# Patient Record
Sex: FEMALE | Race: WHITE | NOT HISPANIC OR LATINO | Employment: FULL TIME | URBAN - METROPOLITAN AREA
[De-identification: names, ages, dates, MRNs, and addresses within clinical notes are randomized per-mention and may not be internally consistent; named-entity substitution may affect disease eponyms.]

---

## 2017-03-21 ENCOUNTER — ALLSCRIPTS OFFICE VISIT (OUTPATIENT)
Dept: OTHER | Facility: OTHER | Age: 37
End: 2017-03-21

## 2017-03-21 DIAGNOSIS — F32.9 MAJOR DEPRESSIVE DISORDER, SINGLE EPISODE: ICD-10-CM

## 2017-03-21 DIAGNOSIS — F41.9 ANXIETY DISORDER: ICD-10-CM

## 2017-04-26 ENCOUNTER — APPOINTMENT (OUTPATIENT)
Dept: LAB | Facility: HOSPITAL | Age: 37
End: 2017-04-26
Payer: COMMERCIAL

## 2017-04-26 DIAGNOSIS — F32.9 MAJOR DEPRESSIVE DISORDER, SINGLE EPISODE: ICD-10-CM

## 2017-04-26 DIAGNOSIS — F41.9 ANXIETY DISORDER: ICD-10-CM

## 2017-04-26 LAB — TSH SERPL DL<=0.05 MIU/L-ACNC: 1.82 UIU/ML (ref 0.36–3.74)

## 2017-04-26 PROCEDURE — 36415 COLL VENOUS BLD VENIPUNCTURE: CPT

## 2017-04-26 PROCEDURE — 84443 ASSAY THYROID STIM HORMONE: CPT

## 2017-05-05 ENCOUNTER — ALLSCRIPTS OFFICE VISIT (OUTPATIENT)
Dept: OTHER | Facility: OTHER | Age: 37
End: 2017-05-05

## 2017-08-09 ENCOUNTER — APPOINTMENT (OUTPATIENT)
Dept: LAB | Facility: HOSPITAL | Age: 37
End: 2017-08-09
Payer: COMMERCIAL

## 2017-08-09 ENCOUNTER — TRANSCRIBE ORDERS (OUTPATIENT)
Dept: ADMINISTRATIVE | Facility: HOSPITAL | Age: 37
End: 2017-08-09

## 2017-08-09 DIAGNOSIS — Z00.8 HEALTH EXAMINATION IN POPULATION SURVEY: Primary | ICD-10-CM

## 2017-08-09 DIAGNOSIS — Z00.8 HEALTH EXAMINATION IN POPULATION SURVEY: ICD-10-CM

## 2017-08-09 LAB
CHOLEST SERPL-MCNC: 211 MG/DL (ref 50–200)
EST. AVERAGE GLUCOSE BLD GHB EST-MCNC: 126 MG/DL
HBA1C MFR BLD: 6 % (ref 4.2–6.3)
HDLC SERPL-MCNC: 39 MG/DL (ref 40–60)
LDLC SERPL CALC-MCNC: 146 MG/DL (ref 0–100)
TRIGL SERPL-MCNC: 132 MG/DL

## 2017-08-09 PROCEDURE — 80061 LIPID PANEL: CPT

## 2017-08-09 PROCEDURE — 83036 HEMOGLOBIN GLYCOSYLATED A1C: CPT

## 2017-08-09 PROCEDURE — 36415 COLL VENOUS BLD VENIPUNCTURE: CPT

## 2017-10-25 ENCOUNTER — ALLSCRIPTS OFFICE VISIT (OUTPATIENT)
Dept: OTHER | Facility: OTHER | Age: 37
End: 2017-10-25

## 2017-10-26 NOTE — PROGRESS NOTES
Assessment  1  Never a smoker   2  Viral illness (079 99) (B34 9)    Plan  Viral illness    · Ondansetron 4 MG Oral Tablet Disintegrating; 1 tab Q 6-8 hrs PRN for nauseou and  vomitting   · Oseltamivir Phosphate 75 MG Oral Capsule (Tamiflu); TAKE 1 CAPSULE TWICE  DAILY WITH MEALS    Discussion/Summary    Viral illness - most likely flu due to exposure, will start Tamiflu 75 mg 1 tab twice daily for 5 days, Zofran as needed every 6 hours for nausea, fluids, rest  Can return to work when fever free for 24 hours  - over due please schedule  as needed  Possible side effects of new medications were reviewed with the patient/guardian today  The treatment plan was reviewed with the patient/guardian  The patient/guardian understands and agrees with the treatment plan      Chief Complaint  Pt presents to the office with c/o flu like sx; pt took 3 motrin at 10 amdue01/06/16      History of Present Illness  HPI: Patient here c/o last night fever, body aches, vomited last night, short of breath, temp 102 F  Take 3 Motrin with temp 99 F  Works at [employer] and has had multiple cases of flu and mutiple exposures, has not had flu shot yet  Active Problems  1  Anxiety (300 00) (F41 9)   2  Depression (311) (F32 9)   3  Encounter for routine gynecological examination (V72 31) (Z01 419)   4  Insomnia (780 52) (G47 00)    Past Medical History  1  History of nausea and vomiting (V12 79) (Z87 898)   2  History of tonsillitis (V12 69) (Z87 09)   3  History of Sore throat (462) (J02 9)   4  Urinary tract infection (599 0) (N39 0)  Active Problems And Past Medical History Reviewed: The active problems and past medical history were reviewed and updated today  Family History  Mother    1  Family history of Family Health Status Of Mother - Good  Father    2  Family history of Family Health Status Of Father - Good  Maternal Grandmother    3  Family history of Breast Cancer (V16 3)   4   Family history of Ovarian Cancer (V16 41)  Paternal Grandmother    5  Family history of Diabetes Mellitus (V18 0)  Paternal Grandfather    10  Family history of Diabetes Mellitus (V18 0)  Family History Reviewed: The family history was reviewed and updated today  Social History   · Alcohol Use (History)   · Caffeine Use   · Denied: History of Drug Use   · Never a smoker   · Non drinker / no alcohol use   · Denied: History of Travel   · Uses Safety Equipment   · Uses Safety Equipment - Seatbelts  The social history was reviewed and updated today  The social history was reviewed and is unchanged  Surgical History  1  History of Biopsy Of Liver   2  History of Breast Surgery Reduction Procedure  Surgical History Reviewed: The surgical history was reviewed and updated today  Current Meds   1  ALPRAZolam 0 25 MG Oral Tablet; TAKE 1/2 TO 1 TABLET EVERY 6 TO 8 HOURS AS   NEEDED FOR ANXIETY; Therapy: [id number] to (Last Rx:21Mar2017) Ordered    The medication list was reviewed and updated today  Allergies  1  Misc  Sulfa Containing Compounds   2  Sulfones   3  Sulfonylureas   4  Clindamycin HCl CAPS   5  Penicillins  Denied    6  Thiazide-Type Diuretics    Vitals   Recorded: [id number] 12:39PM   Temperature 98 9 F, Oral   Heart Rate 72, R Radial   Pulse Quality Normal, R Radial   Respiration Quality Normal   Respiration 20   Systolic 545, RUE, Sitting   Diastolic 68, RUE, Sitting   Height 5 ft 3 5 in   Weight 259 lb 12 8 oz   BMI Calculated 45 3   BSA Calculated 2 17     Physical Exam    Constitutional   General appearance: Abnormal  -- fatigue nontoxic  Eyes   Conjunctiva and lids: No swelling, erythema or discharge  Pupils and irises: Equal, round and reactive to light  Ears, Nose, Mouth, and Throat   External inspection of ears and nose: Normal     Otoscopic examination: Tympanic membranes translucent with normal light reflex  Canals patent without erythema      Nasal mucosa, septum, and turbinates: Normal without edema or erythema  Oropharynx: Normal with no erythema, edema, exudate or lesions  Pulmonary   Respiratory effort: No increased work of breathing or signs of respiratory distress  Auscultation of lungs: Clear to auscultation  Cardiovascular   Palpation of heart: Normal PMI, no thrills  Auscultation of heart: Normal rate and rhythm, normal S1 and S2, without murmurs  Lymphatic   Palpation of lymph nodes in neck: No lymphadenopathy      Psychiatric   Orientation to person, place, and time: Normal     Mood and affect: Normal          Signatures   Electronically signed by : Lubna Sharma, [facility]; Oct 25 2017  1:24PM EST                       (Author)    Electronically signed by : FIORELLA Kirby ; Oct 25 2017  1:42PM EST                       (Author)

## 2017-12-21 ENCOUNTER — ALLSCRIPTS OFFICE VISIT (OUTPATIENT)
Dept: OTHER | Facility: OTHER | Age: 37
End: 2017-12-21

## 2018-01-13 VITALS
RESPIRATION RATE: 16 BRPM | HEIGHT: 63 IN | HEART RATE: 84 BPM | WEIGHT: 258.2 LBS | BODY MASS INDEX: 45.75 KG/M2 | DIASTOLIC BLOOD PRESSURE: 84 MMHG | SYSTOLIC BLOOD PRESSURE: 120 MMHG

## 2018-01-13 VITALS
WEIGHT: 250.5 LBS | BODY MASS INDEX: 44.38 KG/M2 | SYSTOLIC BLOOD PRESSURE: 122 MMHG | HEART RATE: 64 BPM | RESPIRATION RATE: 16 BRPM | DIASTOLIC BLOOD PRESSURE: 84 MMHG | HEIGHT: 63 IN

## 2018-01-15 VITALS
BODY MASS INDEX: 44.35 KG/M2 | RESPIRATION RATE: 20 BRPM | HEART RATE: 72 BPM | DIASTOLIC BLOOD PRESSURE: 68 MMHG | HEIGHT: 64 IN | WEIGHT: 259.8 LBS | TEMPERATURE: 98.9 F | SYSTOLIC BLOOD PRESSURE: 112 MMHG

## 2018-01-16 NOTE — MISCELLANEOUS
Message  Return to work or school:   Andrea Marx is under my professional care  She was seen in my office on 10/25/2017   She is able to return to work on  10/30/2017       [facility]        Signatures   Electronically signed by : Tim Newell, ; Oct 25 2017 12:54PM EST                       (Author)

## 2018-01-22 VITALS
RESPIRATION RATE: 16 BRPM | DIASTOLIC BLOOD PRESSURE: 88 MMHG | TEMPERATURE: 98.6 F | HEART RATE: 76 BPM | HEIGHT: 63 IN | WEIGHT: 268.7 LBS | BODY MASS INDEX: 47.61 KG/M2 | SYSTOLIC BLOOD PRESSURE: 116 MMHG

## 2018-01-23 NOTE — CONSULTS
Chief Complaint  Chief Complaint Free Text Note Form: New patient here for MWM consult; Stop Bang 2/8  History of Present Illness  Free Text HPI: Obesity-  Severity: severe  Onset: since HS  Modifiers: worse after pregnancies, bad eating habits, poor food prepper, stress causes poor eating pattern,; WW-lost 60lbs-8 years ago, isagenix-short term-30lbs, hCG drops 25-30lbs,  Associated Symptoms: stress incontinence, increased joint pain    Goals: 60-70lbs     Past Medical History    1  History of nausea and vomiting (V12 79) (Z87 898)   2  History of tonsillitis (V12 69) (Z87 09)   3  History of Sore throat (462) (J02 9)   4  Urinary tract infection (599 0) (N39 0)  Active Problems And Past Medical History Reviewed: The active problems and past medical history were reviewed and updated today  Assessment    1  Morbid obesity with BMI of 45 0-49 9, adult (278 01,V85 42) (I05 95,H00 20)   2  Hyperlipidemia (272 4) (E78 5)   3  Low HDL (under 40) (272 5) (E78 6)   4  Impaired fasting glucose (790 21) (R73 01)   5  Metabolic syndrome (019 7) (E88 81)    Discussion/Summary  Discussion Summary:   73-year-old female with dyslipidemia, metabolic syndrome and morbid obesity here to pursue medical weight management to improve weight and health  Obesity class 3:  -discussed options of conservative vs RAVINDER program +/- meal replacement vs VLCD and bariatric surgery  -initial focus of 5-10% weight loss over 3-6 mos for improved health  -screening labs-TSH A1c lipids within acceptable limits, check CMP  -discussed the role of weight loss medications    Impaired fasting glucose/dyslipidemia/metabolic syndrome:  -V4K 6 percent  -should improve with 5-10 percent weight loss    RTC in 2 mos  Patient has elected to start with the conservative program  Goals and Barriers: The patient has the current Goals:   Goals:  1  Food log www  myfitnesspal com  2  No sugary beverages  At least 64oz of water daily    3  Track steps on phone and add 1000 daily ultimately reaching 10,000 steps per day   4  9345-6984 calories, see sample menuPatient's Capacity to Self-Care: Patient is able to Self-Care  Patient Education: Educational resources provided: Weight loss packet provided  Understands and agrees with treatment plan: The treatment plan was reviewed with the patient/guardian  The patient/guardian understands and agrees with the treatment plan   Self Referrals:   Self Referrals: Yes Self Referred      Review of Systems  Focused-Female:   Constitutional: no fever  ENT: no sore throat  Cardiovascular: no chest pain and no palpitations  Respiratory: no shortness of breath and no wheezing  Gastrointestinal: no abdominal pain, no nausea, no constipation and no diarrhea  Genitourinary: incontinence, but no dysuria  Musculoskeletal: arthralgias  Integumentary: no rashes  Neurological: no headache  Other Symptoms: Psych: denies depression/anxiety  Active Problems    1  Anxiety (300 00) (F41 9)   2  Depression (311) (F32 9)   3  Encounter for routine gynecological examination (V72 31) (Z01 419)   4  Insomnia (780 52) (G47 00)   5  Viral illness (079 99) (B34 9)    Surgical History    1  History of Biopsy Of Liver   2  History of Breast Surgery Reduction Procedure  Surgical History Reviewed: The surgical history was reviewed and updated today  Family History  Mother    1  Family history of Family Health Status Of Mother - Good  Father    2  Family history of Family Health Status Of Father - Good  Maternal Grandmother    3  Family history of Breast Cancer (V16 3)   4  Family history of Ovarian Cancer (V16 41)  Paternal Grandmother    11  Family history of Diabetes Mellitus (V18 0)  Paternal Grandfather    10  Family history of Diabetes Mellitus (V18 0)  Family History Reviewed: The family history was reviewed and updated today         Social History    · Alcohol Use (History)   · Caffeine Use   · Denied: History of Drug Use   · Never a smoker   · Non drinker / no alcohol use   · Denied: History of Travel   · Uses Safety Equipment   · Uses Safety Equipment - Seatbelts  Social History Reviewed: The social history was reviewed and updated today  Current Meds   1  Motrin 600 MG TABS; Therapy: (Conchita Abler) to Recorded  Medication List Reviewed: The medication list was reviewed and updated today  Allergies    1  Misc  Sulfa Containing Compounds   2  Sulfones   3  Sulfonylureas   4  Clindamycin HCl CAPS   5  Penicillins  Denied    6  Thiazide-Type Diuretics    Vitals  Vital Signs    Recorded: 21Dec2017 11:13AM   Temperature 98 6 F   Heart Rate 76   Respiration 16   Systolic 048   Diastolic 88   Height 5 ft 3 in   Weight 268 lb 11 2 oz   BMI Calculated 47 6   BSA Calculated 2 19   Waist Circumference 46 50   Neck Circumference 16     Physical Exam    Constitutional   General appearance: Abnormal   well developed and morbidly obese  Eyes No conjunctival pallor  Ears, Nose, Mouth, and Throat Moist oral mucosa  Pulmonary   Respiratory effort: No increased work of breathing or signs of respiratory distress  Auscultation of lungs: Clear to auscultation  Cardiovascular   Auscultation of heart: Normal rate and rhythm, normal S1 and S2, without murmurs  Abdomen   Abdomen: Abnormal   The abdomen was obese  The abdomen was soft and nontender     Musculoskeletal   Gait and station: Normal     Psychiatric   Orientation to person, place, and time: Normal          Results/Data  STOP BANG Questionnaire [id number] 11:23AM User, Ahs     Test Name Result Flag Reference   STOP BANG Questionnaire Risk of KEO Low Risk     Snoring: No  Tired: Yes  Observed: No  Blood Pressure: No  BMI: Yes  Age: No  Neck Circumference: No  Gender: No   STOP BANG Questionnaire KEO Total Score 2     Snoring: No  Tired: Yes  Observed: No  Blood Pressure: No  BMI: Yes  Age: No  Neck Circumference: No  Gender: No       Future Appointments    Date/Time Provider Specialty Site   02/19/2018 10:15 AM FIORELLA Mcmanus   Bariatric Medicine [facility]     Signatures   Electronically signed by : FIORELLA Pemberton ; Dec 21 2017  1:12PM EST                       (Author)

## 2018-02-14 RX ORDER — IBUPROFEN 600 MG/1
TABLET ORAL EVERY 6 HOURS PRN
COMMUNITY

## 2018-05-04 ENCOUNTER — TELEPHONE (OUTPATIENT)
Dept: BARIATRICS | Facility: CLINIC | Age: 38
End: 2018-05-04

## 2018-06-04 ENCOUNTER — OFFICE VISIT (OUTPATIENT)
Dept: BARIATRICS | Facility: CLINIC | Age: 38
End: 2018-06-04
Payer: COMMERCIAL

## 2018-06-04 VITALS
BODY MASS INDEX: 48.92 KG/M2 | DIASTOLIC BLOOD PRESSURE: 84 MMHG | HEART RATE: 84 BPM | HEIGHT: 63 IN | SYSTOLIC BLOOD PRESSURE: 120 MMHG | WEIGHT: 276.1 LBS | RESPIRATION RATE: 17 BRPM | TEMPERATURE: 98.4 F

## 2018-06-04 DIAGNOSIS — R53.83 OTHER FATIGUE: ICD-10-CM

## 2018-06-04 DIAGNOSIS — R73.03 PREDIABETES: ICD-10-CM

## 2018-06-04 DIAGNOSIS — E78.2 MIXED HYPERLIPIDEMIA: ICD-10-CM

## 2018-06-04 DIAGNOSIS — E66.01 OBESITY, CLASS III, BMI 40-49.9 (MORBID OBESITY) (HCC): Primary | ICD-10-CM

## 2018-06-04 PROCEDURE — 99214 OFFICE O/P EST MOD 30 MIN: CPT | Performed by: PHYSICIAN ASSISTANT

## 2018-06-04 NOTE — PATIENT INSTRUCTIONS
Goals: Food log (ie ) www myfitnesspal com,sparkpeople  com,loseit com,calorieking  com,etc    No sugary beverages  At least 64oz of water daily    Increase physical activity by 10 minutes daily Gradually increase physical activity to a goal of 5 days per week for 30 minutes of MODERATE intensity PLUS 2 days per week of FULL BODY resistance training  5-10 servings of fruits and vegetables per day  25-35 grams of dietary fiber per day, gradually  0416-6220 calories per day

## 2018-06-04 NOTE — ASSESSMENT & PLAN NOTE
-Patient is pursuing the Conservative Program  -Initial weight loss goal of 5-10% weight loss for improved health  -Screening labs: ordered   A1c and LP with [facility] wellness labs    Initial: 268 7 lbs  Current: 276 1  Change: +7 4 lbs

## 2018-06-04 NOTE — PROGRESS NOTES
Assessment/Plan:    Obesity, Class III, BMI 40-49 9 (morbid obesity) ([address] )  -Patient is pursuing the Conservative Program  -Initial weight loss goal of 5-10% weight loss for improved health  -Screening labs: ordered  A1c and LP with [facility] wellness labs    Initial: 268 7 lbs  Current: 276 1  Change: +7 4 lbs    Mixed hyperlipidemia  -lifestyle changes for now    Prediabetes  -6 0 %  -may improve with weight loss and lifestyle changes  -can consider metformin    Follow up in approximately 8-10 weeks with Non-Surgical Physician/Advanced Practitioner and asap for menu planning  She may consider partial meal replacement    Goals:  Food log (ie ) www myfitnesspal com,sparkpeople  com,loseit com,calorieking  com,etc    No sugary beverages  At least 64oz of water daily  Increase physical activity by 10 minutes daily Gradually increase physical activity to a goal of 5 days per week for 30 minutes of MODERATE intensity PLUS 2 days per week of FULL BODY resistance training  5-10 servings of fruits and vegetables per day  25-35 grams of dietary fiber per day, gradually  5866-5919 calories per day     Diagnoses and all orders for this visit:    Obesity, Class III, BMI 40-49 9 (morbid obesity) ([facility])  -     Vitamin D 25 hydroxy; Future  -     Comprehensive metabolic panel; Future  -     TSH, 3rd generation with T4 reflex; Future    Other fatigue  -     Vitamin D 25 hydroxy; Future  -     Comprehensive metabolic panel; Future  -     TSH, 3rd generation with T4 reflex; Future    Mixed hyperlipidemia  -     Vitamin D 25 hydroxy; Future  -     Comprehensive metabolic panel; Future  -     TSH, 3rd generation with T4 reflex; Future    Prediabetes  -     Vitamin D 25 hydroxy; Future  -     Comprehensive metabolic panel; Future  -     TSH, 3rd generation with T4 reflex; Future    Subjective:   Chief Complaint   Patient presents with    Follow-up     Patient here for [facility] follow up        Patient ID: Libby Pope  is a 41 y o  female with excess weight/obesity here to pursue weight managment  Patient is pursuing Conservative Program      HPI  The patient presents for OD MWM follow up  States that she was following the sample 1200 calorie menu and had been successful  She had lost 16 lbs, but then had increased hunger and had weight regain  Active at work, but no formal exercise  Not reaching hydration goals  Has a 16 oz DD coffee with sugar and cream    The following portions of the patient's history were reviewed and updated as appropriate: allergies, current medications, past family history, past medical history, past social history, past surgical history and problem list     Review of Systems   Respiratory: Negative for cough and shortness of breath  Cardiovascular: Negative for chest pain and palpitations  Gastrointestinal: Positive for constipation  Negative for diarrhea, nausea and vomiting  Skin: Negative for rash  Psychiatric/Behavioral:        Denies sx of depression     Objective:    /84 (BP Location: Left arm, Patient Position: Sitting, Cuff Size: Large)   Pulse 84   Temp 98 4 °F (36 9 °C) (Tympanic)   Resp 17   Ht 5' 3" (1 6 m)   Wt 125 kg (276 lb 1 6 oz)   BMI 48 91 kg/m²      Physical Exam   Nursing note and vitals reviewed  Constitutional   General appearance: Abnormal   well developed and morbidly obese  Eyes No conjunctival pallor  Ears, Nose, Mouth, and Throat Oral mucosa moist    Pulmonary   Respiratory effort: No increased work of breathing or signs of respiratory distress  Auscultation of lungs: Clear to auscultation, equal breath sounds bilaterally, no wheezes, no rales, no rhonci  Cardiovascular   Auscultation of heart: Normal rate and rhythm, normal S1 and S2, without murmurs  Examination of extremities for edema and/or varicosities: Trace nonpitting edema  Abdomen   Abdomen: Abnormal   The abdomen was obese  Bowel sounds were normal  The abdomen was soft and nontender     Musculoskeletal Gait and station: Normal     Psychiatric   Orientation to person, place and time: Normal     Affect: appropriate, but tearful when discussing her weight

## 2018-06-08 ENCOUNTER — OFFICE VISIT (OUTPATIENT)
Dept: BARIATRICS | Facility: CLINIC | Age: 38
End: 2018-06-08

## 2018-06-08 VITALS — WEIGHT: 274.1 LBS | HEIGHT: 63 IN | BODY MASS INDEX: 48.57 KG/M2

## 2018-06-08 DIAGNOSIS — R63.5 ABNORMAL WEIGHT GAIN: ICD-10-CM

## 2018-06-08 PROCEDURE — RECHECK

## 2018-06-08 PROCEDURE — WMDI30

## 2018-06-08 NOTE — PROGRESS NOTES
Weight Management Medical Nutrition Assessment    Ke Sorto is here for meal planning session after seeing PA on 6/4/18 at which time she had gained ~7 lbs since last visit in December 2017  Had been following 1200 calorie diet and reports 16 lb wt loss but then found herself hungry and regained weight  Current wt: lbs  Currently consuming 16 oz DD coffee w/cream & sugar (~170), suggest she use small (120) or medium with skim milk & sugar then wean to splenda  She states she is either really good or goes off completely and has a tendency to try and change everything at once  Typically skips breakfast but over the last week has been having an isogenics shake, she will switch to ours  Water intake is minimal at best  Only consuming 1 bottle of water/day  Recommend over the next few weeks that she consistently eat breakfast, change her coffee and increase to 2 water bottles/day  Meal plan provided  No physical activity but states she used to be a runner  Gained weight after the birth of her youngest 11 yrs ago  Suggested she monitor where she can carve in some time for exercise and consider couch 2 5k shweta  She will f/u in 1 month      Anthropometric Measurements  Start Weight (lbs): 268 7 klbs 12/21/2018  Current Weight (lbs): 274 1 lbs  TBW % Change from start weight: Gain 2%  Ideal Body Weight (lbs):115 lbs  Goal Weight (lbs):    Weight Loss History  Previous weight loss attempts: Counseling with  MD    Food and Nutrition Related History  Wake up: 6   Bed Time:varies    Food Recall  Breakfast:skip OR isogen[employer] shake (has 5 left)  Snack:skip  Lunch: 12 egg salad s/w on wheat wrap & pickles  Snack:skip  Dinner:6: ~3 oz lean protein, 1/2 cup carb, 1 cup veggie s  Snack: sometimes SF chocolate pudding    Beverages: water and coffee/tea  Volume of beverage intake: 16 oz water, 16 oz coffee    Weekends: Same  Cravings: n/a  Trouble area of day:n/a    Frequency of Eating out: 1-2x/wk  Food restrictions:n/a  Cooking: self   Food Shopping: self    Physical Activity Intake  Activity:none  Frequency:never  Physical limitations/barriers to exercise: n/a    Estimated Needs  Energy  [place] Energy Needs: BMR :Anna Cuellar  1-2# loss weekly sedentary:  3872-5914            1-2# loss weekly lightly active:8932-5672  Protein: 63-78      (1 2-1 5g/kg IBW)  Fluid: 62 oz  (35mL/kg IBW)    Nutrition Diagnosis  Yes;     Overweight/obesity  related to Excess energy intake as evidenced by  BMI more than normative standard for age and sex (obesity-grade III 36+)       Nutrition Intervention    Nutrition Prescription  Calories:7949-5341  Protein:63-78  Fluid:80 oz    Meal Plan  Breakfast: replacement  Snack:food snack  Lunch:egg salad wrap ([employer] 450 qing, 16 pro, 57 carb)  Snack:bar  Dinner: 3-4 oz lean protein, 1/2 cup carb, 1-2 cup veggies, 2 fat  Snack: skip or food snack    Nutrition Education:    Calorie controlled menu  Lean protein food choices  Healthy snack options  Food journaling tips      Nutrition Counseling:  Strategies: meal planning, portion sizes, healthy snack choices, hydration, fiber intake, protein intake, exercise, food journal      Monitoring and Evaluation:  Evaluation criteria:  Energy Intake 4378-3153 calories using 1 replacement and 1 bar  Meet protein needs  Maintain adequate hydration  Monitor weekly weight  Meal planning/preparation  Food journal   Decreased portions at mealtimes and snacks  Physical activity     Barriers to learning:none  Readiness to change: Preparation  Comprehension: very good  Expected Compliance: good

## 2018-09-10 ENCOUNTER — APPOINTMENT (OUTPATIENT)
Dept: LAB | Facility: HOSPITAL | Age: 38
End: 2018-09-10
Payer: COMMERCIAL

## 2018-09-10 ENCOUNTER — TRANSCRIBE ORDERS (OUTPATIENT)
Dept: ADMINISTRATIVE | Facility: HOSPITAL | Age: 38
End: 2018-09-10

## 2018-09-10 DIAGNOSIS — Z00.8 HEALTH EXAMINATION IN POPULATION SURVEY: ICD-10-CM

## 2018-09-10 DIAGNOSIS — E66.01 OBESITY, CLASS III, BMI 40-49.9 (MORBID OBESITY) (HCC): ICD-10-CM

## 2018-09-10 DIAGNOSIS — R73.03 PREDIABETES: ICD-10-CM

## 2018-09-10 DIAGNOSIS — R53.83 OTHER FATIGUE: ICD-10-CM

## 2018-09-10 DIAGNOSIS — Z00.8 HEALTH EXAMINATION IN POPULATION SURVEY: Primary | ICD-10-CM

## 2018-09-10 DIAGNOSIS — E78.2 MIXED HYPERLIPIDEMIA: ICD-10-CM

## 2018-09-10 LAB
25(OH)D3 SERPL-MCNC: 16.8 NG/ML (ref 30–100)
ALBUMIN SERPL BCP-MCNC: 3.7 G/DL (ref 3.5–5)
ALP SERPL-CCNC: 78 U/L (ref 46–116)
ALT SERPL W P-5'-P-CCNC: 43 U/L (ref 12–78)
ANION GAP SERPL CALCULATED.3IONS-SCNC: 12 MMOL/L (ref 4–13)
AST SERPL W P-5'-P-CCNC: 28 U/L (ref 5–45)
BILIRUB SERPL-MCNC: 0.31 MG/DL (ref 0.2–1)
BUN SERPL-MCNC: 12 MG/DL (ref 5–25)
CALCIUM SERPL-MCNC: 9.3 MG/DL (ref 8.3–10.1)
CHLORIDE SERPL-SCNC: 102 MMOL/L (ref 100–108)
CHOLEST SERPL-MCNC: 235 MG/DL (ref 50–200)
CO2 SERPL-SCNC: 25 MMOL/L (ref 21–32)
CREAT SERPL-MCNC: 0.79 MG/DL (ref 0.6–1.3)
EST. AVERAGE GLUCOSE BLD GHB EST-MCNC: 120 MG/DL
GFR SERPL CREATININE-BSD FRML MDRD: 96 ML/MIN/1.73SQ M
GLUCOSE SERPL-MCNC: 90 MG/DL (ref 65–140)
HBA1C MFR BLD: 5.8 % (ref 4.2–6.3)
HDLC SERPL-MCNC: 43 MG/DL (ref 40–60)
LDLC SERPL CALC-MCNC: 173 MG/DL (ref 0–100)
NONHDLC SERPL-MCNC: 192 MG/DL
POTASSIUM SERPL-SCNC: 4.3 MMOL/L (ref 3.5–5.3)
PROT SERPL-MCNC: 8.2 G/DL (ref 6.4–8.2)
SODIUM SERPL-SCNC: 139 MMOL/L (ref 136–145)
TRIGL SERPL-MCNC: 93 MG/DL
TSH SERPL DL<=0.05 MIU/L-ACNC: 3.69 UIU/ML (ref 0.36–3.74)

## 2018-09-10 PROCEDURE — 84443 ASSAY THYROID STIM HORMONE: CPT

## 2018-09-10 PROCEDURE — 83036 HEMOGLOBIN GLYCOSYLATED A1C: CPT

## 2018-09-10 PROCEDURE — 82306 VITAMIN D 25 HYDROXY: CPT

## 2018-09-10 PROCEDURE — 80061 LIPID PANEL: CPT

## 2018-09-10 PROCEDURE — 36415 COLL VENOUS BLD VENIPUNCTURE: CPT

## 2018-09-10 PROCEDURE — 80053 COMPREHEN METABOLIC PANEL: CPT

## 2018-09-11 ENCOUNTER — TELEPHONE (OUTPATIENT)
Dept: BARIATRICS | Facility: CLINIC | Age: 38
End: 2018-09-11

## 2018-09-11 DIAGNOSIS — E55.9 VITAMIN D DEFICIENCY: Primary | ICD-10-CM

## 2018-09-11 RX ORDER — ERGOCALCIFEROL 1.25 MG/1
50000 CAPSULE ORAL WEEKLY
Qty: 12 CAPSULE | Refills: 0 | Status: SHIPPED | OUTPATIENT
Start: 2018-09-11 | End: 2018-09-11 | Stop reason: SDUPTHER

## 2018-09-11 RX ORDER — ERGOCALCIFEROL 1.25 MG/1
50000 CAPSULE ORAL WEEKLY
Qty: 12 CAPSULE | Refills: 0 | Status: SHIPPED | OUTPATIENT
Start: 2018-09-11 | End: 2019-04-18

## 2018-09-11 NOTE — TELEPHONE ENCOUNTER
Please let the patient know that her vitamin D was low, would like her to start Rx vitamin D weekly for 12 weeks  After she completes this, she should take OTC vitamin D 0284-9495 IU daily for maintenance  We will repeat the vitamin D level in 4-6 months  Her cholesterol was elevated  She should continue management with her PCP  Her A1c has improved from 6 to 5 8 %  This should continue to improve with lifestyle changes  Recommend the patient follow a low fat, low cholesterol diet, limiting refined carbohydrates like white bread, white rice, white pasta, chips/pretzels, sweets/desserts, and sugary beverages  Increase fruits/vegetables  Increase exercise as tolerated  Otherwise labs within acceptable limits

## 2019-03-19 ENCOUNTER — TELEPHONE (OUTPATIENT)
Dept: NEUROLOGY | Facility: CLINIC | Age: 39
End: 2019-03-19

## 2019-03-19 NOTE — TELEPHONE ENCOUNTER
Patient called back and confirmed appointment on 3/29/19   Patient does not have any records to bring, she is self referral

## 2019-03-29 ENCOUNTER — OFFICE VISIT (OUTPATIENT)
Dept: NEUROLOGY | Facility: CLINIC | Age: 39
End: 2019-03-29
Payer: COMMERCIAL

## 2019-03-29 VITALS
SYSTOLIC BLOOD PRESSURE: 139 MMHG | RESPIRATION RATE: 12 BRPM | BODY MASS INDEX: 49.43 KG/M2 | DIASTOLIC BLOOD PRESSURE: 90 MMHG | HEIGHT: 63 IN | HEART RATE: 88 BPM | WEIGHT: 279 LBS

## 2019-03-29 DIAGNOSIS — H81.10 BENIGN PAROXYSMAL POSITIONAL VERTIGO, UNSPECIFIED LATERALITY: Primary | ICD-10-CM

## 2019-03-29 DIAGNOSIS — R51.9 WORSENING HEADACHES: ICD-10-CM

## 2019-03-29 PROCEDURE — 99245 OFF/OP CONSLTJ NEW/EST HI 55: CPT | Performed by: PSYCHIATRY & NEUROLOGY

## 2019-03-29 RX ORDER — LORAZEPAM 1 MG/1
TABLET ORAL
Qty: 2 TABLET | Refills: 0 | Status: SHIPPED | OUTPATIENT
Start: 2019-03-29

## 2019-03-29 NOTE — PROGRESS NOTES
[facility]IPLE [facility]  PATIENT:  Brandy Baker  MRN:  [id number]  :  1980  DATE OF SERVICE:  3/29/2019  Assessment/Plan:     Problem List Items Addressed This Visit        Nervous and Auditory    Benign paroxysmal positional vertigo - Primary    Relevant Medications    LORazepam (ATIVAN) 1 mg tablet    Other Relevant Orders    MRI brain IAC wo and w contrast       Other    Worsening headaches    Relevant Medications    LORazepam (ATIVAN) 1 mg tablet    Other Relevant Orders    MRI brain IAC wo and w contrast         Mrs Marroquin has presented for evaluation of Vertigo, with several bouts has been described  Considering her clinical presentation, concern comes for vestibular migraine vs BPPV vs vestibular neuritis  Patient had complete resolution of her symptoms when she had Epley maneuver  Patient did develop severe migraine this week, with transient difficulties expressing herself without vertiginous phenomenon  MRI brain will be advised to rule out IAC neoplasm, demyelination or other acute inflammatory of ischemic changes  If patient has recurrent bouts of vertigo, Diazepam 5 mg tid with zofran and Vestibular therapy would be advised immediately  No PT recommended now as patient is completely asymptomatic  Patient likely developed left ear posterior canal BPPV , though no nystagmus noted  Considering patient has known history of migraine and she had recurrent migraines up to 6 times a month - she will be starting magnesium 400 mg a day, B2 200mg bid and B12 1000 mcg daily  If headache symptoms of concern - decadron and compazine will be used as abortive therapy, toradol and cambia might be of high benefits, will avoid triptan, with concern for vestibular migraine  From preventive therapy may consider venlafaxine vs Topamax vs amitriptyline  No focal weakness or numbness noted on today's evaluation, normal tandem gait and coordination  Negative Orthostatic vital signs       Follow up in 4-6 months vs immediately if symptoms recure  Subjective: vertigo    HPI/History of Present Illness    Mrs Gama Dobbins has a history of depression, anxiety, HLD, insomnia metabolic syndrome, prediabetes, who presented for evaluation of dizziness  No focal neurologic deficit has been described  February 25, 2019 she had severe vertigo and event lasted for 12 hours, this month she had developed similar episodes again  Patient had developed headache - she experienced aphasia and word findings difficulties  No ENT evaluation and no imaging completed  Nystagmus was to the left side,   Patient had Epley maneuver done 2 days ago and he repeated 3 times, with dizziness had resolved  No ear ache or fevers  She had improvement in her function   Patient had severe headache yesterday- she woke up at 3 am and it lasted for 14 hours and she had toradol and benadryl with resolution of headache noted  Supine 130/82 mmHg   Sitting : 122/86 mmHg 82 bpm  Standing : 118/86  MmHg , 98 bpm  The following portions of the patient's history were reviewed and updated as appropriate:   She  has a past medical history of Anxiety, Depression, Hyperlipidemia, Insomnia, Low HDL (under 40), Metabolic syndrome, Morbid obesity (HCC), Nausea and vomiting, Sore throat, Tonsillitis, UTI (urinary tract infection), and Viral illness  She   Patient Active Problem List    Diagnosis Date Noted    Benign paroxysmal positional vertigo 03/29/2019    Worsening headaches 03/29/2019    Obesity, Class III, BMI 40-49 9 (morbid obesity) ([facility] [address] ) 06/04/2018    Mixed hyperlipidemia 06/04/2018    Prediabetes 06/04/2018     She  has a past surgical history that includes Liver biopsy and Reduction mammaplasty (2006)  Her family history includes No Known Problems in her father and mother  She  reports that she has never smoked  She has never used smokeless tobacco  She reports that she does not drink alcohol or use drugs    Current Outpatient Medications   Medication Sig Dispense Refill    ibuprofen (MOTRIN) 600 mg tablet Take by mouth every 6 (six) hours as needed for mild pain      ergocalciferol (VITAMIN D2) 50,000 units Take 1 capsule (50,000 Units total) by mouth once a week for 85 days 12 capsule 0    LORazepam (ATIVAN) 1 mg tablet Take 1 tab 40 min prior to imaging with a second dose 10 min prior to MRI 2 tablet 0     No current facility-administered medications for this visit  Current Outpatient Medications on File Prior to Visit   Medication Sig    ibuprofen (MOTRIN) 600 mg tablet Take by mouth every 6 (six) hours as needed for mild pain    ergocalciferol (VITAMIN D2) 50,000 units Take 1 capsule (50,000 Units total) by mouth once a week for 85 days     No current facility-administered medications on file prior to visit  She is allergic to sulfa antibiotics; sulfones [dapsone]; sulfonylureas; thiazide-type diuretics; clindamycin; and penicillins            Objective:    Blood pressure 139/90, pulse 88, resp  rate 12, height 5' 3" (1 6 m), weight 127 kg (279 lb)  Physical Exam/Neurological Exam  CONSTITUTIONAL: NAD, pleasant  NECK: supple, no lymphadenopathy, no thyromegaly, no JVD  CARDIOVASCULAR: RRR, normal S1S2, no murmurs, no rubs  RESP: clear to auscultation bilaterally, no wheezes/rhonchi/rales  ABDOMEN: soft, non tender, non distended  SKIN: no rash or skin lesions  EXTREMITIES: no edema, pulses 2+bilaterally  PSYCH: appropriate mood and affect  NEUROLOGIC COMPREHENSIVE EXAM: Patient is oriented to person, place and time, NAD; appropriate affect  CN II, III, IV, V, VI, VII,VIII,IX,X,XI-XII intact with EOMI, PERRLA, OKN intact, VF grossly intact, fundi poorly visualized secondary to pupillary constriction; symmetric face noted  Motor: 5/5 UE/LE bilateral symmetric; Sensory: intact to light touch and pinprick bilaterally; normal vibration sensation feet bilaterally;  Coordination within normal limits on FTN and MAGGIE testing; DTR: 2/4 through, no Babinski, no clonus  Tandem gait is intact  Romberg: negative  ROS:  12 points of review of system was reviewed with the patient and was unremarkable with exception: see HPI  Review of Systems   Constitutional: Negative  Negative for appetite change and fever  HENT: Negative  Negative for hearing loss, tinnitus, trouble swallowing and voice change  Eyes: Negative  Negative for photophobia and pain  Respiratory: Negative  Negative for shortness of breath  Cardiovascular: Negative  Negative for palpitations  Gastrointestinal: Negative  Negative for nausea and vomiting  Endocrine: Negative  Negative for cold intolerance and heat intolerance  Genitourinary: Negative  Negative for dysuria, frequency and urgency  Musculoskeletal: Negative  Negative for myalgias and neck pain  Skin: Negative  Negative for rash  Neurological: Negative  Negative for dizziness, tremors, seizures, syncope, facial asymmetry, speech difficulty, weakness, light-headedness, numbness and headaches  Hematological: Negative  Does not bruise/bleed easily  Psychiatric/Behavioral: Negative  Negative for confusion, hallucinations and sleep disturbance

## 2019-04-12 ENCOUNTER — HOSPITAL ENCOUNTER (OUTPATIENT)
Dept: MRI IMAGING | Facility: HOSPITAL | Age: 39
Discharge: HOME | End: 2019-04-12
Attending: PSYCHIATRY & NEUROLOGY
Payer: COMMERCIAL

## 2019-04-12 ENCOUNTER — HOSPITAL ENCOUNTER (OUTPATIENT)
Dept: MRI IMAGING | Facility: HOSPITAL | Age: 39
Discharge: HOME | End: 2019-04-12
Attending: PSYCHIATRY & NEUROLOGY

## 2019-04-12 ENCOUNTER — TRANSCRIBE ORDERS (OUTPATIENT)
Dept: ADMINISTRATIVE | Facility: HOSPITAL | Age: 39
End: 2019-04-12

## 2019-04-12 DIAGNOSIS — R51.9 WORSENING HEADACHES: ICD-10-CM

## 2019-04-12 DIAGNOSIS — H81.10 BENIGN PAROXYSMAL POSITIONAL VERTIGO, UNSPECIFIED LATERALITY: ICD-10-CM

## 2019-04-12 DIAGNOSIS — R51.9 WORSENING HEADACHES: Primary | ICD-10-CM

## 2019-04-12 PROCEDURE — A9585 GADOBUTROL INJECTION: HCPCS | Performed by: PSYCHIATRY & NEUROLOGY

## 2019-04-12 PROCEDURE — 70553 MRI BRAIN STEM W/O & W/DYE: CPT

## 2019-04-12 RX ADMIN — GADOBUTROL 12 ML: 604.72 INJECTION INTRAVENOUS at 22:24

## 2019-04-18 ENCOUNTER — APPOINTMENT (EMERGENCY)
Dept: CT IMAGING | Facility: HOSPITAL | Age: 39
End: 2019-04-18
Payer: COMMERCIAL

## 2019-04-18 ENCOUNTER — HOSPITAL ENCOUNTER (EMERGENCY)
Facility: HOSPITAL | Age: 39
Discharge: HOME | End: 2019-04-18
Attending: EMERGENCY MEDICINE | Admitting: EMERGENCY MEDICINE
Payer: COMMERCIAL

## 2019-04-18 VITALS
HEART RATE: 102 BPM | WEIGHT: 280.87 LBS | TEMPERATURE: 98.1 F | BODY MASS INDEX: 49.75 KG/M2 | DIASTOLIC BLOOD PRESSURE: 59 MMHG | OXYGEN SATURATION: 94 % | RESPIRATION RATE: 18 BRPM | SYSTOLIC BLOOD PRESSURE: 131 MMHG

## 2019-04-18 DIAGNOSIS — N39.0 UTI (URINARY TRACT INFECTION): ICD-10-CM

## 2019-04-18 DIAGNOSIS — R10.30 LOWER ABDOMINAL PAIN: Primary | ICD-10-CM

## 2019-04-18 LAB
ALBUMIN SERPL BCP-MCNC: 3.8 G/DL (ref 3.5–5)
ALP SERPL-CCNC: 95 U/L (ref 46–116)
ALT SERPL W P-5'-P-CCNC: 43 U/L (ref 12–78)
ANION GAP SERPL CALCULATED.3IONS-SCNC: 12 MMOL/L (ref 4–13)
AST SERPL W P-5'-P-CCNC: 24 U/L (ref 5–45)
BACTERIA UR QL AUTO: ABNORMAL /HPF
BASOPHILS # BLD AUTO: 0.04 THOUSANDS/ΜL (ref 0–0.1)
BASOPHILS NFR BLD AUTO: 0 % (ref 0–1)
BILIRUB SERPL-MCNC: 0.32 MG/DL (ref 0.2–1)
BILIRUB UR QL STRIP: NEGATIVE
BUN SERPL-MCNC: 14 MG/DL (ref 5–25)
CALCIUM SERPL-MCNC: 9.5 MG/DL (ref 8.3–10.1)
CHLORIDE SERPL-SCNC: 100 MMOL/L (ref 100–108)
CLARITY UR: CLEAR
CO2 SERPL-SCNC: 26 MMOL/L (ref 21–32)
COLOR UR: YELLOW
CREAT SERPL-MCNC: 0.94 MG/DL (ref 0.6–1.3)
EOSINOPHIL # BLD AUTO: 0.17 THOUSAND/ΜL (ref 0–0.61)
EOSINOPHIL NFR BLD AUTO: 2 % (ref 0–6)
ERYTHROCYTE [DISTWIDTH] IN BLOOD BY AUTOMATED COUNT: 13.5 % (ref 11.6–15.1)
EXT PREG TEST URINE: NEGATIVE
GFR SERPL CREATININE-BSD FRML MDRD: 77 ML/MIN/1.73SQ M
GLUCOSE SERPL-MCNC: 100 MG/DL (ref 65–140)
GLUCOSE UR STRIP-MCNC: NEGATIVE MG/DL
HCT VFR BLD AUTO: 40.7 % (ref 34.8–46.1)
HGB BLD-MCNC: 12.8 G/DL (ref 11.5–15.4)
HGB UR QL STRIP.AUTO: ABNORMAL
IMM GRANULOCYTES # BLD AUTO: 0.11 THOUSAND/UL (ref 0–0.2)
IMM GRANULOCYTES NFR BLD AUTO: 1 % (ref 0–2)
KETONES UR STRIP-MCNC: ABNORMAL MG/DL
LEUKOCYTE ESTERASE UR QL STRIP: ABNORMAL
LIPASE SERPL-CCNC: 66 U/L (ref 73–393)
LYMPHOCYTES # BLD AUTO: 2.63 THOUSANDS/ΜL (ref 0.6–4.47)
LYMPHOCYTES NFR BLD AUTO: 23 % (ref 14–44)
MCH RBC QN AUTO: 27.4 PG (ref 26.8–34.3)
MCHC RBC AUTO-ENTMCNC: 31.4 G/DL (ref 31.4–37.4)
MCV RBC AUTO: 87 FL (ref 82–98)
MONOCYTES # BLD AUTO: 0.83 THOUSAND/ΜL (ref 0.17–1.22)
MONOCYTES NFR BLD AUTO: 7 % (ref 4–12)
NEUTROPHILS # BLD AUTO: 7.79 THOUSANDS/ΜL (ref 1.85–7.62)
NEUTS SEG NFR BLD AUTO: 67 % (ref 43–75)
NITRITE UR QL STRIP: NEGATIVE
NON-SQ EPI CELLS URNS QL MICRO: ABNORMAL /HPF
NRBC BLD AUTO-RTO: 0 /100 WBCS
PH UR STRIP.AUTO: 6 [PH] (ref 4.5–8)
PLATELET # BLD AUTO: 285 THOUSANDS/UL (ref 149–390)
PMV BLD AUTO: 9.1 FL (ref 8.9–12.7)
POTASSIUM SERPL-SCNC: 3.9 MMOL/L (ref 3.5–5.3)
PROT SERPL-MCNC: 8.1 G/DL (ref 6.4–8.2)
PROT UR STRIP-MCNC: NEGATIVE MG/DL
RBC # BLD AUTO: 4.68 MILLION/UL (ref 3.81–5.12)
RBC #/AREA URNS AUTO: ABNORMAL /HPF
SODIUM SERPL-SCNC: 138 MMOL/L (ref 136–145)
SP GR UR STRIP.AUTO: >=1.03 (ref 1–1.03)
UROBILINOGEN UR QL STRIP.AUTO: 0.2 E.U./DL
WBC # BLD AUTO: 11.57 THOUSAND/UL (ref 4.31–10.16)
WBC #/AREA URNS AUTO: ABNORMAL /HPF

## 2019-04-18 PROCEDURE — 96375 TX/PRO/DX INJ NEW DRUG ADDON: CPT

## 2019-04-18 PROCEDURE — 36415 COLL VENOUS BLD VENIPUNCTURE: CPT | Performed by: EMERGENCY MEDICINE

## 2019-04-18 PROCEDURE — 85025 COMPLETE CBC W/AUTO DIFF WBC: CPT | Performed by: EMERGENCY MEDICINE

## 2019-04-18 PROCEDURE — 74177 CT ABD & PELVIS W/CONTRAST: CPT

## 2019-04-18 PROCEDURE — 87147 CULTURE TYPE IMMUNOLOGIC: CPT

## 2019-04-18 PROCEDURE — 99285 EMERGENCY DEPT VISIT HI MDM: CPT | Performed by: EMERGENCY MEDICINE

## 2019-04-18 PROCEDURE — 80053 COMPREHEN METABOLIC PANEL: CPT | Performed by: EMERGENCY MEDICINE

## 2019-04-18 PROCEDURE — 81025 URINE PREGNANCY TEST: CPT | Performed by: EMERGENCY MEDICINE

## 2019-04-18 PROCEDURE — 87086 URINE CULTURE/COLONY COUNT: CPT

## 2019-04-18 PROCEDURE — 96374 THER/PROPH/DIAG INJ IV PUSH: CPT

## 2019-04-18 PROCEDURE — 81001 URINALYSIS AUTO W/SCOPE: CPT

## 2019-04-18 PROCEDURE — 83690 ASSAY OF LIPASE: CPT | Performed by: EMERGENCY MEDICINE

## 2019-04-18 PROCEDURE — 96361 HYDRATE IV INFUSION ADD-ON: CPT

## 2019-04-18 PROCEDURE — 99284 EMERGENCY DEPT VISIT MOD MDM: CPT

## 2019-04-18 RX ORDER — MORPHINE SULFATE 4 MG/ML
4 INJECTION, SOLUTION INTRAMUSCULAR; INTRAVENOUS ONCE
Status: COMPLETED | OUTPATIENT
Start: 2019-04-18 | End: 2019-04-18

## 2019-04-18 RX ORDER — DICYCLOMINE HCL 20 MG
20 TABLET ORAL EVERY 6 HOURS PRN
Qty: 10 TABLET | Refills: 0 | Status: SHIPPED | OUTPATIENT
Start: 2019-04-18

## 2019-04-18 RX ORDER — NITROFURANTOIN 25; 75 MG/1; MG/1
100 CAPSULE ORAL ONCE
Status: COMPLETED | OUTPATIENT
Start: 2019-04-18 | End: 2019-04-18

## 2019-04-18 RX ORDER — DICYCLOMINE HCL 20 MG
20 TABLET ORAL ONCE
Status: COMPLETED | OUTPATIENT
Start: 2019-04-18 | End: 2019-04-18

## 2019-04-18 RX ORDER — KETOROLAC TROMETHAMINE 30 MG/ML
30 INJECTION, SOLUTION INTRAMUSCULAR; INTRAVENOUS ONCE
Status: COMPLETED | OUTPATIENT
Start: 2019-04-18 | End: 2019-04-18

## 2019-04-18 RX ORDER — NITROFURANTOIN 25; 75 MG/1; MG/1
100 CAPSULE ORAL EVERY 12 HOURS
Qty: 14 CAPSULE | Refills: 0 | Status: SHIPPED | OUTPATIENT
Start: 2019-04-18 | End: 2019-04-25

## 2019-04-18 RX ADMIN — IOHEXOL 100 ML: 350 INJECTION, SOLUTION INTRAVENOUS at 20:06

## 2019-04-18 RX ADMIN — SODIUM CHLORIDE 1000 ML: 0.9 INJECTION, SOLUTION INTRAVENOUS at 18:58

## 2019-04-18 RX ADMIN — DICYCLOMINE HYDROCHLORIDE 20 MG: 20 TABLET ORAL at 22:01

## 2019-04-18 RX ADMIN — MORPHINE SULFATE 4 MG: 4 INJECTION INTRAVENOUS at 20:52

## 2019-04-18 RX ADMIN — NITROFURANTOIN MONOHYDRATE/MACROCRYSTALLINE 100 MG: 25; 75 CAPSULE ORAL at 22:01

## 2019-04-18 RX ADMIN — KETOROLAC TROMETHAMINE 30 MG: 30 INJECTION, SOLUTION INTRAMUSCULAR; INTRAVENOUS at 19:53

## 2019-04-19 LAB — BACTERIA UR CULT: ABNORMAL

## 2019-08-29 ENCOUNTER — TELEPHONE (OUTPATIENT)
Dept: NEUROLOGY | Facility: CLINIC | Age: 39
End: 2019-08-29

## 2019-08-29 DIAGNOSIS — H81.10 BENIGN PAROXYSMAL POSITIONAL VERTIGO, UNSPECIFIED LATERALITY: Primary | ICD-10-CM

## 2019-08-29 RX ORDER — ONDANSETRON HYDROCHLORIDE 8 MG/1
8 TABLET, FILM COATED ORAL EVERY 12 HOURS PRN
Qty: 30 TABLET | Refills: 0 | Status: SHIPPED | OUTPATIENT
Start: 2019-08-29

## 2019-08-29 RX ORDER — DIAZEPAM 5 MG/1
5 TABLET ORAL EVERY 8 HOURS PRN
Qty: 21 TABLET | Refills: 0 | Status: SHIPPED | OUTPATIENT
Start: 2019-08-29

## 2019-08-29 NOTE — TELEPHONE ENCOUNTER
Patient reports she woke up with severe vertigo this AM with nausea  Denies any other sxs  She stated that at office visit Dr Yamilex Merchant advised she call if vertigo would return and valium would be prescribed  Patient is requesting rx be sent to [facility]  Patient requesting a call back

## 2019-08-29 NOTE — TELEPHONE ENCOUNTER
Patient has in her pharmacy diazepam 5 mg tid and Zofran 8 mg bid  PT referral is available for Epley maneuver

## 2019-10-01 ENCOUNTER — EVALUATION (OUTPATIENT)
Dept: PHYSICAL THERAPY | Facility: CLINIC | Age: 39
End: 2019-10-01
Payer: COMMERCIAL

## 2019-10-01 DIAGNOSIS — H81.10 BENIGN PAROXYSMAL POSITIONAL VERTIGO, UNSPECIFIED LATERALITY: ICD-10-CM

## 2019-10-01 DIAGNOSIS — R42 CERVICAL VERTIGO: Primary | ICD-10-CM

## 2019-10-01 PROCEDURE — 97162 PT EVAL MOD COMPLEX 30 MIN: CPT | Performed by: PHYSICAL THERAPIST

## 2019-10-01 PROCEDURE — 97140 MANUAL THERAPY 1/> REGIONS: CPT | Performed by: PHYSICAL THERAPIST

## 2019-10-01 PROCEDURE — 97112 NEUROMUSCULAR REEDUCATION: CPT | Performed by: PHYSICAL THERAPIST

## 2019-10-01 NOTE — PROGRESS NOTES
PT Evaluation     Today's date: 10/1/2019  Patient name: Alvaro Bedolla  : 1980  MRN: [id number]  Referring provider: Esvin Hawthorne, *  Dx:   Encounter Diagnosis     ICD-10-CM    1  Cervical vertigo R42    2  Benign paroxysmal positional vertigo, unspecified laterality H81 10 Ambulatory referral to Physical Therapy                  Assessment  Assessment details: Pt is a 45 yo female with vertigo presenting with signs and symptoms of cervicogenic vertigo  At the time of initial evaluation, she was negative for BPPV of posterior and horizontal canals  Plan to reassess and treat as needed  She presents with postural deficits, dyscoordination and poor endurance of deep neck flexors, decreased cervical ROM, and decreased scapular stability  She is a good candidate for outpatient physical therapy to address the above impairments and optimize functional status  Functional limitations: difficult time with all functional position changes and head turns during episodeUnderstanding of Dx/Px/POC: good   Prognosis: good    Goals  STG - 3 weeks  1  Independent with HEP  2  Improve postural awareness to decrease forward head posture during daily tasks     LTG - 6 weeks  1  Increase strength of scapular stabilizers by 1 MMT grade to improve posture  2  Tolerate all functional position changes and head turns without onset of vertigo to promote return to PLOF  3  Improve ROM of C/S retraction and extension by 25% to improve posture and body mechanics  4  Tolerate DNF contraction without substitution at least 10 sec to improve postural stability  Plan  Plan details: Provided with and reviewed initial written HEP  Reviewed physical exam findings and plan of care  All questions answered to patient's satisfaction      Patient would benefit from: skilled physical therapy  Planned modality interventions: low level laser therapy, unattended electrical stimulation and cryotherapy  Planned therapy interventions: manual therapy, patient education, neuromuscular re-education, canalith repositioning, therapeutic activities, therapeutic exercise and home exercise program  Frequency: 2x week  Duration in weeks: 6  Treatment plan discussed with: patient        Subjective Evaluation    History of Present Illness  Date of onset: 4/1/2019  Mechanism of injury: Pt reports appx 6 months ago she had a severe case of vertigo with no apparent cause for onset  That episode seemed to resolve on its own but she reports near daily minor episodes that do always seem to be movement related  She had a brain MRI that revealed no abnormalities  She arrives today with referral to outpatient PT      PMH insignificant  Pain  No pain reported    Social Support    Employment status: working (director of ER)    Diagnostic Tests  MRI studies: normal  Treatments  Previous treatment: medication  Patient Goals  Patient goal: eliminate vertigo        Objective          Dysequilibrium: Yes  Lightheadedness: No  Vertigo: Yes  Rocking or Swaying: No         Oscillopsia: Yes  Diplopia: No  Motion sickness: Yes  Floating, Swimming, Disconnected: Yes    Exacerbation Factors:  Bending over: No  Turning Head: Yes  Rolling in bed: Yes  Walking: Yes  Looking up: Yes  Supine to/from sitting: No  Optokinetic movement: Yes  Walking in busy environment: No    Duration of Symptoms: <1 min     Concurrent Complaints:  Tinnitus:No  Aural Fullness:No  Known hearing loss:No  Nausea, Vomiting: Yes  Altered Vision: No  Poor Concentration: Yes  Memory Loss: No  Peripheral Neuropathy:No  Cervical Pain: No   Headache: Yes suboccipital      PHYSICAL FINDINGS:  Oculomotor ROM : WFL  Resting nystagmus: No  Gaze holding nystagmus No   Smooth pursuit Normal    Vertical Saccades:Normal  Horizontal Saccades:Normal  Convergence: Normal    Cover/Uncover/Crosscover Test: Normal    Head thrust (room light): Normal      Positional testing: Right Left   [facility] pik  negative  negative   Roll test:  negative  negative       Cervical ROM:  Retraction: 50% pain  Flexion: WFL  Extension: 50   Right rotation:75  Left rotation:75  Right lateral flexion:20 pain  Left lateral flexion:38    Deep Neck Flexor Endurance = >30 sec with substitution of SCM  Subjective Visual Vertical Bucket Test = abnormal 7 deg left  Cervical Torsion Smooth Pursuit = abnormal bilaterally  Neck Torsion Nystagmus Test = vertigo, no nystagmus with both  Joint Position Error Test = abnormal bilaterally                     Precautions: none    Daily Treatment Diary       Manuals 10/1            SOR KT            C/S traction + retraction             B OA mobs             T/S and lower C/S PAs KT            Exercise Diary              Bwd UBE             Posture t-band 10x ea red            Lower trap t-band             Lower trap wall slide + lift off             Prone YTI             Prone scap sq             WTI + serr punch supine roll             Supine retraction  w/ biofeedback             Seated retraction 5"x10                                                                                          Postural Ed Sleep, sitting                                                                                          Modalities

## 2019-10-04 ENCOUNTER — OFFICE VISIT (OUTPATIENT)
Dept: NEUROLOGY | Facility: CLINIC | Age: 39
End: 2019-10-04
Payer: COMMERCIAL

## 2019-10-04 VITALS
HEIGHT: 64 IN | WEIGHT: 272 LBS | SYSTOLIC BLOOD PRESSURE: 127 MMHG | DIASTOLIC BLOOD PRESSURE: 74 MMHG | BODY MASS INDEX: 46.44 KG/M2 | HEART RATE: 80 BPM

## 2019-10-04 DIAGNOSIS — R51.9 WORSENING HEADACHES: ICD-10-CM

## 2019-10-04 DIAGNOSIS — E78.2 MIXED HYPERLIPIDEMIA: ICD-10-CM

## 2019-10-04 DIAGNOSIS — G43.109 MIGRAINE WITH AURA AND WITHOUT STATUS MIGRAINOSUS, NOT INTRACTABLE: ICD-10-CM

## 2019-10-04 DIAGNOSIS — R73.03 PREDIABETES: ICD-10-CM

## 2019-10-04 DIAGNOSIS — R42 PERSISTENT POSTURAL-PERCEPTUAL DIZZINESS: ICD-10-CM

## 2019-10-04 DIAGNOSIS — H81.10 BENIGN PAROXYSMAL POSITIONAL VERTIGO, UNSPECIFIED LATERALITY: Primary | ICD-10-CM

## 2019-10-04 PROCEDURE — 96372 THER/PROPH/DIAG INJ SC/IM: CPT | Performed by: PSYCHIATRY & NEUROLOGY

## 2019-10-04 PROCEDURE — 99215 OFFICE O/P EST HI 40 MIN: CPT | Performed by: PSYCHIATRY & NEUROLOGY

## 2019-10-04 RX ORDER — KETOROLAC TROMETHAMINE 30 MG/ML
30 INJECTION, SOLUTION INTRAMUSCULAR; INTRAVENOUS ONCE
Status: COMPLETED | OUTPATIENT
Start: 2019-10-04 | End: 2019-10-04

## 2019-10-04 RX ORDER — KETOROLAC TROMETHAMINE 10 MG/1
10 TABLET, FILM COATED ORAL EVERY 6 HOURS PRN
Qty: 20 TABLET | Refills: 0 | Status: SHIPPED | OUTPATIENT
Start: 2019-10-04

## 2019-10-04 RX ORDER — SUMATRIPTAN 50 MG/1
TABLET, FILM COATED ORAL
Qty: 9 TABLET | Refills: 3 | Status: SHIPPED | OUTPATIENT
Start: 2019-10-04

## 2019-10-04 RX ORDER — PROCHLORPERAZINE MALEATE 10 MG
10 TABLET ORAL EVERY 6 HOURS PRN
Qty: 30 TABLET | Refills: 0 | Status: SHIPPED | OUTPATIENT
Start: 2019-10-04

## 2019-10-04 RX ORDER — PROCHLORPERAZINE EDISYLATE 5 MG/ML
10 INJECTION INTRAMUSCULAR; INTRAVENOUS ONCE
Status: COMPLETED | OUTPATIENT
Start: 2019-10-04 | End: 2019-10-04

## 2019-10-04 RX ORDER — VENLAFAXINE HYDROCHLORIDE 37.5 MG/1
CAPSULE, EXTENDED RELEASE ORAL
Qty: 120 CAPSULE | Refills: 2 | Status: SHIPPED | OUTPATIENT
Start: 2019-10-04

## 2019-10-04 RX ADMIN — PROCHLORPERAZINE EDISYLATE 10 MG: 5 INJECTION INTRAMUSCULAR; INTRAVENOUS at 12:46

## 2019-10-04 RX ADMIN — KETOROLAC TROMETHAMINE 30 MG: 30 INJECTION, SOLUTION INTRAMUSCULAR; INTRAVENOUS at 12:42

## 2019-10-04 NOTE — PROGRESS NOTES
[facility]  PATIENT:  Keo Naranjo  MRN:  [id number]  :  1980  DATE OF SERVICE:  10/4/2019    Assessment/Plan:     Problem List Items Addressed This Visit        Nervous and Auditory    Benign paroxysmal positional vertigo - Primary    Relevant Medications    venlafaxine (EFFEXOR-XR) 37 5 mg 24 hr capsule       Other    Mixed hyperlipidemia    Prediabetes    Worsening headaches    Relevant Medications    prochlorperazine (COMPAZINE) injection 10 mg (Completed)    ketorolac (TORADOL) injection 30 mg (Completed)    ketorolac (TORADOL) 10 mg tablet      Other Visit Diagnoses     Persistent postural-perceptual dizziness        Relevant Medications    venlafaxine (EFFEXOR-XR) 37 5 mg 24 hr capsule    Migraine with aura and without status migrainosus, not intractable        Relevant Medications    venlafaxine (EFFEXOR-XR) 37 5 mg 24 hr capsule    ketorolac (TORADOL) injection 30 mg (Completed)    prochlorperazine (COMPAZINE) 10 mg tablet    ketorolac (TORADOL) 10 mg tablet    SUMAtriptan (IMITREX) 50 mg tablet           Mrs Marroquin has presented for follow up on dizziness with non-spining vertigo has been described  Unremarkable brain MRI was noted, with no signs of CNS demyelination appreciated  No IAC pathology has been noted either  We discussed that she has likely developed PPPD with initial vertiginous bout this year, and SSRI with CBT would be advised at this point  Considering PPPD and migraine headaches we will be initiating venlafaxine 37 5 mg titration, with a final dose is Venlafaxine  mg a day  Vestibular therapy will be on hold, along with CBT  Orthostatic vital signs were completed - supine /68 mmHg and sitting /77 mmHg with HR 77 and 78 bpm accordingly  Patient described classic migraine headaches - - we are starting venlafaxine and I will poovide ketorolac 10 mg and compazine 10 mg for abortive therapy, along with imitrex 50 mg tab   Ketorolac 30 mg IM and compazine 10 mg IM were given during this visit  Follow up in 3-6 months  Subjective: dizziness and headaches  HPI History of Present Illness    Mrs Marroquin has depression, anxiety, severe vertigo, HLD, insomnia, metabolic syndrome, prediabetes, who presented for follow up on dizziness  Prior evaluation brought concern for vestibular migraine vs BPPV vs vestibular neuritis  Patient did develop severe migraine previously, with transient difficulties expressing herself without vertiginous phenomenon reported  MRI brain was advised to rule out intracranial pathology and her findings were negative for IAC neoplasm, demyelination or other acute inflammatory of ischemic changes, and was completely unremarkable  Patient called 8/29 as she has recurrent bout of vertigo with diazepam 5 mg tid with Zofran 8 mg prescribed  Venlafaxine 150 mg should be considered for HA prevention  The following portions of the patient's history were reviewed and updated as appropriate:   She  has a past medical history of Anxiety, Depression, Hyperlipidemia, Insomnia, Low HDL (under 40), Metabolic syndrome, Morbid obesity ([address] ), Nausea and vomiting, Sore throat, Tonsillitis, UTI (urinary tract infection), and Viral illness  She   Patient Active Problem List    Diagnosis Date Noted    Benign paroxysmal positional vertigo 03/29/2019    Worsening headaches 03/29/2019    Obesity, Class III, BMI 40-49 9 (morbid obesity) ([address] ) 06/04/2018    Mixed hyperlipidemia 06/04/2018    Prediabetes 06/04/2018     She  has a past surgical history that includes Liver biopsy and Reduction mammaplasty (2006)  Her family history includes No Known Problems in her father and mother  She  reports that she has never smoked  She has never used smokeless tobacco  She reports that she does not drink alcohol or use drugs    Current Outpatient Medications   Medication Sig Dispense Refill    diazepam (VALIUM) 5 mg tablet Take 1 tablet (5 mg total) by mouth every 8 (eight) hours as needed for anxiety 21 tablet 0    ibuprofen (MOTRIN) 600 mg tablet Take by mouth every 6 (six) hours as needed for mild pain      ondansetron (ZOFRAN) 8 mg tablet Take 1 tablet (8 mg total) by mouth every 12 (twelve) hours as needed for nausea or vomiting (vertigo) 30 tablet 0    dicyclomine (BENTYL) 20 mg tablet Take 1 tablet (20 mg total) by mouth every 6 (six) hours as needed (pain) (Patient not taking: Reported on 10/4/2019) 10 tablet 0    ketorolac (TORADOL) 10 mg tablet Take 1 tablet (10 mg total) by mouth every 6 (six) hours as needed for moderate pain 20 tablet 0    LORazepam (ATIVAN) 1 mg tablet Take 1 tab 40 min prior to imaging with a second dose 10 min prior to MRI (Patient not taking: Reported on 10/4/2019) 2 tablet 0    prochlorperazine (COMPAZINE) 10 mg tablet Take 1 tablet (10 mg total) by mouth every 6 (six) hours as needed for nausea or vomiting 30 tablet 0    SUMAtriptan (IMITREX) 50 mg tablet Take 1 tab within 15 minutes when headaches starts, with a second dose advised in 2 hours if headache persist 9 tablet 3    venlafaxine (EFFEXOR-XR) 37 5 mg 24 hr capsule Take 1 capsPO at night for 7 days, then increase by 1 caps every week till 4 caps a day 120 capsule 2     No current facility-administered medications for this visit        Current Outpatient Medications on File Prior to Visit   Medication Sig    diazepam (VALIUM) 5 mg tablet Take 1 tablet (5 mg total) by mouth every 8 (eight) hours as needed for anxiety    ibuprofen (MOTRIN) 600 mg tablet Take by mouth every 6 (six) hours as needed for mild pain    ondansetron (ZOFRAN) 8 mg tablet Take 1 tablet (8 mg total) by mouth every 12 (twelve) hours as needed for nausea or vomiting (vertigo)    dicyclomine (BENTYL) 20 mg tablet Take 1 tablet (20 mg total) by mouth every 6 (six) hours as needed (pain) (Patient not taking: Reported on 10/4/2019)    LORazepam (ATIVAN) 1 mg tablet Take 1 tab 40 min prior to imaging with a second dose 10 min prior to MRI (Patient not taking: Reported on 10/4/2019)     No current facility-administered medications on file prior to visit  She is allergic to sulfa antibiotics; sulfones [dapsone]; sulfonylureas; clindamycin; and penicillins            Objective:    Blood pressure 127/74, pulse 80, height 5' 4" (1 626 m), weight 123 kg (272 lb)  Physical Exam/Neurological Exam  CONSTITUTIONAL: NAD, pleasant  NECK: supple, no lymphadenopathy, no thyromegaly, no JVD  CARDIOVASCULAR: RRR, normal S1S2, no murmurs, no rubs  RESP: clear to auscultation bilaterally, no wheezes/rhonchi/rales  ABDOMEN: soft, non tender, non distended  SKIN: no rash or skin lesions  EXTREMITIES: no edema, pulses 2+bilaterally  PSYCH: appropriate mood and affect  NEUROLOGIC COMPREHENSIVE EXAM: Patient is oriented to person, place and time, NAD; appropriate affect  CN II, III, IV, V, VI, VII,VIII,IX,X,XI-XII intact with EOMI, PERRLA, OKN intact, VF grossly intact, fundi poorly visualized secondary to pupillary constriction; symmetric face noted  Motor: 5/5 UE/LE bilateral symmetric; Sensory: intact to light touch and pinprick bilaterally; normal vibration sensation feet bilaterally; Coordination within normal limits on FTN and MAGGIE testing; DTR: 2/4 through, no Babinski, no clonus  Tandem gait is intact  Romberg: negative  ROS:  12 points of review of system was reviewed with the patient and was unremarkable with exception: see HPI  Review of Systems   Constitutional: Negative  Negative for appetite change and fever  HENT: Negative  Negative for hearing loss, tinnitus, trouble swallowing and voice change  Eyes: Negative  Negative for photophobia and pain  Respiratory: Negative  Negative for shortness of breath  Cardiovascular: Negative  Negative for palpitations  Gastrointestinal: Negative  Negative for nausea and vomiting  Endocrine: Negative    Negative for cold intolerance and heat intolerance  Genitourinary: Negative  Negative for dysuria, frequency and urgency  Musculoskeletal: Negative  Negative for myalgias and neck pain  Skin: Negative  Negative for rash  Neurological: Positive for dizziness and headaches  Negative for tremors, seizures, syncope, facial asymmetry, speech difficulty, weakness, light-headedness and numbness  Hematological: Negative  Does not bruise/bleed easily  Psychiatric/Behavioral: Negative  Negative for confusion, hallucinations and sleep disturbance

## 2019-10-08 ENCOUNTER — OFFICE VISIT (OUTPATIENT)
Dept: PHYSICAL THERAPY | Facility: CLINIC | Age: 39
End: 2019-10-08
Payer: COMMERCIAL

## 2019-10-08 DIAGNOSIS — H81.10 BENIGN PAROXYSMAL POSITIONAL VERTIGO, UNSPECIFIED LATERALITY: Primary | ICD-10-CM

## 2019-10-08 DIAGNOSIS — R42 CERVICAL VERTIGO: ICD-10-CM

## 2019-10-08 PROCEDURE — 97140 MANUAL THERAPY 1/> REGIONS: CPT | Performed by: PHYSICAL THERAPIST

## 2019-10-08 PROCEDURE — 97112 NEUROMUSCULAR REEDUCATION: CPT | Performed by: PHYSICAL THERAPIST

## 2019-10-08 NOTE — PROGRESS NOTES
Daily Note     Today's date: 10/8/2019  Patient name: Merlinda Amsterdam  : 1980  MRN: [id number]  Referring provider: Ailin Bowen, *  Dx:   Encounter Diagnosis     ICD-10-CM    1  Benign paroxysmal positional vertigo, unspecified laterality H81 10    2  Cervical vertigo R42                   Subjective: Pt reports she returned to her MD and was diagnosed with persistent postural perceptual dizziness, which is treated with an SSRI  She would like to postpone taking medication and see if PT helps  She has had 1-2 episodes of dizziness since last session but is not having any upon arrival       Objective: See treatment diary below      Assessment: Tolerated treatment well  Patient exhibited good technique with therapeutic exercises and would benefit from continued PT      Plan: Continue per plan of care              Precautions: none    Daily Treatment Diary       Manuals 10/1 10/8           SOR KT KT           C/S traction + retraction  KT           B OA mobs  KT           T/S and lower C/S PAs KT KT           Exercise Diary              Bwd UBE             Posture t-band 10x ea red            Lower trap t-band  10x red           Lower trap wall slide + lift off  10x           Prone YTI  10x ea           Prone scap sq  5"x10           WTI + serr punch supine roll             Supine retraction  w/ biofeedback  10"x10           Seated retraction 5"x10 5"x10                                                                                         Postural Ed Sleep, sitting                                                                                          Modalities

## 2019-10-14 ENCOUNTER — OFFICE VISIT (OUTPATIENT)
Dept: PHYSICAL THERAPY | Facility: CLINIC | Age: 39
End: 2019-10-14
Payer: COMMERCIAL

## 2019-10-14 DIAGNOSIS — R42 CERVICAL VERTIGO: Primary | ICD-10-CM

## 2019-10-14 PROCEDURE — 97140 MANUAL THERAPY 1/> REGIONS: CPT | Performed by: PHYSICAL THERAPIST

## 2019-10-14 PROCEDURE — 97112 NEUROMUSCULAR REEDUCATION: CPT | Performed by: PHYSICAL THERAPIST

## 2019-10-14 NOTE — PROGRESS NOTES
Daily Note     Today's date: 10/14/2019  Patient name: Pablo Aparicio  : 1980  MRN: [id number]  Referring provider: Matilde Cortez, *  Dx:   Encounter Diagnosis     ICD-10-CM    1  Cervical vertigo R42                   Subjective: Pt reports she had a terrible headache starting the next morning for the entire day  She took toradol and compazine which relieved the pain somewhat  The next morning, she woke up without a headache and hasn't had a headache or vertigo since  Objective: See treatment diary below      Assessment: Tolerated treatment well  Patient exhibited good technique with therapeutic exercises and would benefit from continued PT  Pt notes increased ROM after manual work  Plan: Continue per plan of care              Precautions: none    Daily Treatment Diary       Manuals 10/1 10/8 10/14          SOR KT KT KT          C/S traction + retraction  KT KT          B OA mobs  KT KT          T/S and lower C/S PAs KT KT KT          Exercise Diary              Bwd UBE             Posture t-band 10x ea red  15x ea           Lower trap t-band  10x red 15x          Lower trap wall slide + lift off  10x 15x red          Prone YTI  10x ea 15x ea          Prone scap sq  5"x10 5"x15          WTI + serr punch supine roll             Supine retraction  w/ biofeedback  10"x10 No BF 10"x10          Seated retraction 5"x10 5"x10 10"x15          Self T/S mobs on foam roll   2'          Doorway pec str   30"x3                                                              Postural Ed Sleep, sitting                                                                                          Modalities

## 2019-10-16 ENCOUNTER — TELEPHONE (OUTPATIENT)
Dept: FAMILY MEDICINE CLINIC | Facility: CLINIC | Age: 39
End: 2019-10-16

## 2019-10-16 NOTE — TELEPHONE ENCOUNTER
Patient called, asking for a Serum HCG lab order  She said she is 2 weeks late with her period and she has negative us tests        [phone]

## 2019-10-17 ENCOUNTER — OFFICE VISIT (OUTPATIENT)
Dept: PHYSICAL THERAPY | Facility: CLINIC | Age: 39
End: 2019-10-17
Payer: COMMERCIAL

## 2019-10-17 DIAGNOSIS — H81.10 BENIGN PAROXYSMAL POSITIONAL VERTIGO, UNSPECIFIED LATERALITY: ICD-10-CM

## 2019-10-17 DIAGNOSIS — R42 CERVICAL VERTIGO: Primary | ICD-10-CM

## 2019-10-17 PROCEDURE — 97110 THERAPEUTIC EXERCISES: CPT | Performed by: PHYSICAL THERAPIST

## 2019-10-17 PROCEDURE — 97140 MANUAL THERAPY 1/> REGIONS: CPT | Performed by: PHYSICAL THERAPIST

## 2019-10-17 PROCEDURE — 97112 NEUROMUSCULAR REEDUCATION: CPT | Performed by: PHYSICAL THERAPIST

## 2019-10-17 NOTE — PROGRESS NOTES
Daily Note     Today's date: 10/17/2019  Patient name: Cliff Talavera  : 1980  MRN: [id number]  Referring provider: Nels Frankel, *  Dx:   Encounter Diagnosis     ICD-10-CM    1  Cervical vertigo R42    2  Benign paroxysmal positional vertigo, unspecified laterality H81 10                   Subjective: Pt reports no dizziness since initial evaluation  She again reported a headache the day after last visit, however, it was not as intense  She does arrive with a headache today across her forehead 4/10  Objective: See treatment diary below      Assessment: Tolerated treatment well  Patient exhibited good technique with therapeutic exercises and would benefit from continued PT  No change in frontal headache with session  Plan: Continue per plan of care              Precautions: none    Daily Treatment Diary       Manuals 10/1 10/8 10/14 10/17         SOR KT KT KT KT         C/S traction + retraction  KT KT KT         B OA mobs  KT KT KT         T/S and lower C/S PAs KT KT KT KT         Exercise Diary              Bwd UBE             Posture t-band 10x ea red  15x ea  20x ea red         Lower trap t-band  10x red 15x 20x red         Lower trap wall slide + lift off  10x 15x red 20x red         Prone YTI  10x ea 15x ea 20x ea         Prone scap sq  5"x10 5"x15 5"x20         WTI + serr punch supine roll             Supine retraction  w/ biofeedback  10"x10 No BF 10"x10 10"x10 no BF         Seated retraction 5"x10 5"x10 10"x15 NV         Self T/S mobs on foam roll   2' 2'         Doorway pec str   30"x3 NV                                                             Postural Ed Sleep, sitting                                                                                          Modalities

## 2019-10-24 ENCOUNTER — APPOINTMENT (OUTPATIENT)
Dept: PHYSICAL THERAPY | Facility: CLINIC | Age: 39
End: 2019-10-24
Payer: COMMERCIAL

## 2019-10-29 ENCOUNTER — OFFICE VISIT (OUTPATIENT)
Dept: PHYSICAL THERAPY | Facility: CLINIC | Age: 39
End: 2019-10-29
Payer: COMMERCIAL

## 2019-10-29 DIAGNOSIS — R42 CERVICAL VERTIGO: Primary | ICD-10-CM

## 2019-10-29 DIAGNOSIS — H81.10 BENIGN PAROXYSMAL POSITIONAL VERTIGO, UNSPECIFIED LATERALITY: ICD-10-CM

## 2019-10-29 PROCEDURE — 97110 THERAPEUTIC EXERCISES: CPT | Performed by: PHYSICAL THERAPIST

## 2019-10-29 PROCEDURE — 97112 NEUROMUSCULAR REEDUCATION: CPT | Performed by: PHYSICAL THERAPIST

## 2019-10-29 PROCEDURE — 97140 MANUAL THERAPY 1/> REGIONS: CPT | Performed by: PHYSICAL THERAPIST

## 2019-10-29 NOTE — PROGRESS NOTES
Daily Note     Today's date: 10/29/2019  Patient name: Florence Valle  : 1980  MRN: [id number]  Referring provider: Jessica Wright, *  Dx:   Encounter Diagnosis     ICD-10-CM    1  Cervical vertigo R42    2  Benign paroxysmal positional vertigo, unspecified laterality H81 10                   Subjective: Pt reports she has had headaches on and off all this week and one episode of dizziness two days ago  The only difference is that she didn't see PT last week  No headache or dizziness upon arrival today  Objective: See treatment diary below      Assessment: Tolerated treatment well  Patient exhibited good technique with therapeutic exercises and would benefit from continued PT  Tolerated increased resistance with posture t-band today  Plan: Continue per plan of care              Precautions: none    Daily Treatment Diary       Manuals 10/1 10/8 10/14 10/17 10/29        SOR KT KT KT KT KT        C/S traction + retraction  KT KT KT KT        B OA mobs  KT KT KT KT        T/S and lower C/S PAs KT KT KT KT KT        Exercise Diary              Bwd UBE             Posture t-band 10x ea red  15x ea  20x ea red 20x ea grn        Lower trap t-band  10x red 15x 20x red 20x grn        Lower trap wall slide + lift off  10x 15x red 20x red 20x grn        Prone YTI  10x ea 15x ea 20x ea 20x ea        Prone scap sq  5"x10 5"x15 5"x20 5"x20        WTI + serr punch supine roll             Supine retraction  w/ biofeedback  10"x10 No BF 10"x10 10"x10 no BF 10"x10 no BF        Seated retraction 5"x10 5"x10 10"x15 NV 5"x10        Self T/S mobs on foam roll   2' 2' 2'        Doorway pec str   30"x3 NV 30"x3                                                            Postural Ed Sleep, sitting                                                                                          Modalities

## 2019-10-31 ENCOUNTER — OFFICE VISIT (OUTPATIENT)
Dept: PHYSICAL THERAPY | Facility: CLINIC | Age: 39
End: 2019-10-31
Payer: COMMERCIAL

## 2019-10-31 DIAGNOSIS — H81.10 BENIGN PAROXYSMAL POSITIONAL VERTIGO, UNSPECIFIED LATERALITY: ICD-10-CM

## 2019-10-31 DIAGNOSIS — R42 CERVICAL VERTIGO: Primary | ICD-10-CM

## 2019-10-31 PROCEDURE — 97140 MANUAL THERAPY 1/> REGIONS: CPT | Performed by: PHYSICAL THERAPIST

## 2019-10-31 PROCEDURE — 97112 NEUROMUSCULAR REEDUCATION: CPT | Performed by: PHYSICAL THERAPIST

## 2019-10-31 PROCEDURE — 97110 THERAPEUTIC EXERCISES: CPT | Performed by: PHYSICAL THERAPIST

## 2019-10-31 NOTE — PROGRESS NOTES
Daily Note     Today's date: 10/31/2019  Patient name: Georgette Maguire  : 1980  MRN: [id number]  Referring provider: Keegan San, *  Dx:   Encounter Diagnosis     ICD-10-CM    1  Cervical vertigo R42    2  Benign paroxysmal positional vertigo, unspecified laterality H81 10                   Subjective: Pt reports no headaches since last visit but she did have appx 30 min of mild dizziness yesterday  Objective: See treatment diary below      Assessment: Tolerated treatment well  Patient demonstrated fatigue post treatment and exhibited good technique with therapeutic exercises  Fatigue noted with increased reps today  Continued tightness       Plan: Continue per plan of care              Precautions: none    Daily Treatment Diary       Manuals 10/1 10/8 10/14 10/17 10/29 10/31       SOR KT KT KT KT KT KT       C/S traction + retraction  KT KT KT KT KT       B OA mobs  KT KT KT KT KT       T/S and lower C/S PAs KT KT KT KT KT KT       Exercise Diary              Bwd UBE             Posture t-band 10x ea red  15x ea  20x ea red 20x ea grn 25x ea grn       Lower trap t-band  10x red 15x 20x red 20x grn 25x grn       Lower trap wall slide + lift off  10x 15x red 20x red 20x grn 25x grn       Prone YTI  10x ea 15x ea 20x ea 20x ea 30x ea       Prone scap sq  5"x10 5"x15 5"x20 5"x20 5"x30       WTI + serr punch supine roll             Supine retraction  w/ biofeedback  10"x10 No BF 10"x10 10"x10 no BF 10"x10 no BF 10"x10 no BF       Seated retraction 5"x10 5"x10 10"x15 NV 5"x10 5"x15       Self T/S mobs on foam roll   2' 2' 2' 2'       Doorway pec str   30"x3 NV 30"x3        L OA str on towel roll      20"x3                                              Postural Ed Sleep, sitting                                                                                          Modalities

## 2019-11-04 ENCOUNTER — EVALUATION (OUTPATIENT)
Dept: PHYSICAL THERAPY | Facility: CLINIC | Age: 39
End: 2019-11-04
Payer: COMMERCIAL

## 2019-11-04 DIAGNOSIS — H81.10 BENIGN PAROXYSMAL POSITIONAL VERTIGO, UNSPECIFIED LATERALITY: ICD-10-CM

## 2019-11-04 DIAGNOSIS — R42 CERVICAL VERTIGO: Primary | ICD-10-CM

## 2019-11-04 PROCEDURE — 97140 MANUAL THERAPY 1/> REGIONS: CPT | Performed by: PHYSICAL THERAPIST

## 2019-11-04 PROCEDURE — 97110 THERAPEUTIC EXERCISES: CPT | Performed by: PHYSICAL THERAPIST

## 2019-11-04 PROCEDURE — 97112 NEUROMUSCULAR REEDUCATION: CPT | Performed by: PHYSICAL THERAPIST

## 2019-11-07 ENCOUNTER — OFFICE VISIT (OUTPATIENT)
Dept: PHYSICAL THERAPY | Facility: CLINIC | Age: 39
End: 2019-11-07
Payer: COMMERCIAL

## 2019-11-07 DIAGNOSIS — R42 CERVICAL VERTIGO: Primary | ICD-10-CM

## 2019-11-07 PROCEDURE — 97140 MANUAL THERAPY 1/> REGIONS: CPT | Performed by: PHYSICAL THERAPIST

## 2019-11-07 PROCEDURE — 97112 NEUROMUSCULAR REEDUCATION: CPT | Performed by: PHYSICAL THERAPIST

## 2019-11-07 PROCEDURE — 97110 THERAPEUTIC EXERCISES: CPT | Performed by: PHYSICAL THERAPIST

## 2019-11-07 NOTE — PROGRESS NOTES
Daily Note     Today's date: 2019  Patient name: Armida Patton  : 1980  MRN: [id number]  Referring provider: Juve Morataya, *  Dx:   Encounter Diagnosis     ICD-10-CM    1  Cervical vertigo R42                   Subjective: Pt reports she had average soreness after last session but no vertigo  No symptoms upon arrival       Objective: See treatment diary below      Assessment: Tolerated treatment well  Patient with markedly increased L rotation ROM after MET  Plan: Continue per plan of care          Precautions: none    Daily Treatment Diary       Manuals 10/1 10/8 10/14 10/17 10/29 10/31 11/4 11/7     SOR KT KT KT KT KT KT KT KT     C/S traction + retraction  KT KT KT KT KT KT KT     B OA mobs  KT KT KT KT KT KT KT     L CC rotation MET        KT     L OA MET        KT     T/S and lower C/S PAs KT KT KT KT KT KT KT KT     Exercise Diary              Bwd UBE             Posture t-band 10x ea red  15x ea  20x ea red 20x ea grn 25x ea grn 30x ea grn 30x ea grn     Lower trap t-band  10x red 15x 20x red 20x grn 25x grn 30x grn 30x grn     Lower trap wall slide + lift off  10x 15x red 20x red 20x grn 25x grn 30x grn 30x grn     Prone YTI  10x ea 15x ea 20x ea 20x ea 30x ea 30x ea 30x ea     Prone scap sq  5"x10 5"x15 5"x20 5"x20 5"x30 5"x30 5"x30     WTI + serr punch supine roll             Supine retraction  w/ biofeedback  10"x10 No BF 10"x10 10"x10 no BF 10"x10 no BF 10"x10 no BF       Seated retraction 5"x10 5"x10 10"x15 NV 5"x10 5"x15 W/ ext 10x W/ ext 15x     Self T/S mobs on foam roll   2' 2' 2' 2'       Doorway pec str   30"x3 NV 30"x3   30"x3     L OA str on towel roll      20"x3                                              Postural Ed Sleep, sitting                                                                                          Modalities

## 2019-11-12 ENCOUNTER — OFFICE VISIT (OUTPATIENT)
Dept: PHYSICAL THERAPY | Facility: CLINIC | Age: 39
End: 2019-11-12
Payer: COMMERCIAL

## 2019-11-12 DIAGNOSIS — R42 CERVICAL VERTIGO: Primary | ICD-10-CM

## 2019-11-12 PROCEDURE — 97140 MANUAL THERAPY 1/> REGIONS: CPT | Performed by: PHYSICAL THERAPIST

## 2019-11-12 PROCEDURE — 97112 NEUROMUSCULAR REEDUCATION: CPT | Performed by: PHYSICAL THERAPIST

## 2019-11-12 PROCEDURE — 97110 THERAPEUTIC EXERCISES: CPT | Performed by: PHYSICAL THERAPIST

## 2019-11-12 NOTE — PROGRESS NOTES
Daily Note     Today's date: 2019  Patient name: Marko Pirce  : 1980  MRN: [id number]  Referring provider: Mala Adame, *  Dx:   Encounter Diagnosis     ICD-10-CM    1  Cervical vertigo R42                   Subjective: Pt reports she was sore after last visit and has a headache today but otherwise has been feeling pretty good  No recent episodes of vertigo  Objective: See treatment diary below      Assessment: Tolerated treatment well  Patient exhibited good technique with therapeutic exercises and would benefit from continued PT      Plan: Continue per plan of care        Precautions: none    Daily Treatment Diary       Manuals 10/1 10/8 10/14 10/17 10/29 10/31 11/4 11/7 11/12    SOR KT KT KT KT KT KT KT KT KT    C/S traction + retraction  KT KT KT KT KT KT KT KT    B OA mobs  KT KT KT KT KT KT KT KT L only    L CC rotation MET        KT KT    L OA MET        KT KT    T/S and lower C/S PAs KT KT KT KT KT KT KT KT KT T/S only    Exercise Diary              Bwd UBE             Posture t-band 10x ea red  15x ea  20x ea red 20x ea grn 25x ea grn 30x ea grn 30x ea grn     Lower trap t-band  10x red 15x 20x red 20x grn 25x grn 30x grn 30x grn     Lower trap wall slide + lift off  10x 15x red 20x red 20x grn 25x grn 30x grn 30x grn     Prone YTI  10x ea 15x ea 20x ea 20x ea 30x ea 30x ea 30x ea 30x ea    Prone scap sq  5"x10 5"x15 5"x20 5"x20 5"x30 5"x30 5"x30 5"x30    WTI + serr punch supine roll             Supine retraction  w/ biofeedback  10"x10 No BF 10"x10 10"x10 no BF 10"x10 no BF 10"x10 no BF       Seated retraction 5"x10 5"x10 10"x15 NV 5"x10 5"x15 W/ ext 10x W/ ext 15x W/ ext 15x    Self T/S mobs on foam roll   2' 2' 2' 2'       Doorway pec str   30"x3 NV 30"x3   30"x3     L OA str on towel roll      20"x3   20"x3    Iso chin tuck         5"x10    Serr wall slide         10x grn    Serr bear hug         10x grn    Postural Ed Sleep, sitting Modalities

## 2019-11-14 ENCOUNTER — APPOINTMENT (OUTPATIENT)
Dept: PHYSICAL THERAPY | Facility: CLINIC | Age: 39
End: 2019-11-14
Payer: COMMERCIAL

## 2019-11-19 ENCOUNTER — OFFICE VISIT (OUTPATIENT)
Dept: PHYSICAL THERAPY | Facility: CLINIC | Age: 39
End: 2019-11-19
Payer: COMMERCIAL

## 2019-11-19 DIAGNOSIS — H81.10 BENIGN PAROXYSMAL POSITIONAL VERTIGO, UNSPECIFIED LATERALITY: ICD-10-CM

## 2019-11-19 DIAGNOSIS — R42 CERVICAL VERTIGO: Primary | ICD-10-CM

## 2019-11-19 PROCEDURE — 97140 MANUAL THERAPY 1/> REGIONS: CPT | Performed by: PHYSICAL THERAPIST

## 2019-11-19 PROCEDURE — 97112 NEUROMUSCULAR REEDUCATION: CPT | Performed by: PHYSICAL THERAPIST

## 2019-11-19 PROCEDURE — 97110 THERAPEUTIC EXERCISES: CPT | Performed by: PHYSICAL THERAPIST

## 2019-11-19 NOTE — PROGRESS NOTES
Daily Note     Today's date: 2019  Patient name: Janette Lam  : 1980  MRN: [id number]  Referring provider: Marlyn Echols, *  Dx:   Encounter Diagnosis     ICD-10-CM    1  Cervical vertigo R42    2  Benign paroxysmal positional vertigo, unspecified laterality H81 10                   Subjective: Pt reports having a migraine headache after last visit but has been great since then with no headaches or dizziness  She is planning on one more session after today and then discharging to [facility]  Objective: See treatment diary below      Assessment: Tolerated treatment well  Patient exhibited good technique with therapeutic exercises      Plan: Potential discharge next visit       Precautions: none    Daily Treatment Diary       Manuals 10/1 10/8 10/14 10/17 10/29 10/31 11/4 11/7 11/12 11/19   SOR KT KT KT KT KT KT KT KT KT KT   C/S traction + retraction  KT KT KT KT KT KT KT KT KT   B OA mobs  KT KT KT KT KT KT KT KT L only KT L only   L CC rotation MET        KT KT KT   L OA MET        KT KT KT   T/S and lower C/S PAs KT KT KT KT KT KT KT KT KT T/S only    Exercise Diary              Bwd UBE             Posture t-band 10x ea red  15x ea  20x ea red 20x ea grn 25x ea grn 30x ea grn 30x ea grn     Lower trap t-band  10x red 15x 20x red 20x grn 25x grn 30x grn 30x grn     Lower trap wall slide + lift off  10x 15x red 20x red 20x grn 25x grn 30x grn 30x grn     Prone YTI  10x ea 15x ea 20x ea 20x ea 30x ea 30x ea 30x ea 30x ea 30x ea   Prone scap sq  5"x10 5"x15 5"x20 5"x20 5"x30 5"x30 5"x30 5"x30 5"x30   WTI + serr punch supine roll             Supine retraction  w/ biofeedback  10"x10 No BF 10"x10 10"x10 no BF 10"x10 no BF 10"x10 no BF       Seated retraction 5"x10 5"x10 10"x15 NV 5"x10 5"x15 W/ ext 10x W/ ext 15x W/ ext 15x W/ ext 15x   Self T/S mobs on foam roll   2' 2' 2' 2'       Doorway pec str   30"x3 NV 30"x3   30"x3     L OA str on towel roll      20"x3   20"x3 20"x3   Iso chin tuck 5"x10 5"x10   Serr wall slide         10x grn 20x grn   Serr bear hug         10x grn 20x grn   Postural Ed Sleep, sitting                                                                                          Modalities

## 2019-11-21 ENCOUNTER — APPOINTMENT (OUTPATIENT)
Dept: PHYSICAL THERAPY | Facility: CLINIC | Age: 39
End: 2019-11-21
Payer: COMMERCIAL

## 2019-11-26 ENCOUNTER — APPOINTMENT (OUTPATIENT)
Dept: PHYSICAL THERAPY | Facility: CLINIC | Age: 39
End: 2019-11-26
Payer: COMMERCIAL

## 2020-02-03 ENCOUNTER — TELEPHONE (OUTPATIENT)
Dept: NEUROLOGY | Facility: CLINIC | Age: 40
End: 2020-02-03

## 2020-02-03 NOTE — TELEPHONE ENCOUNTER
Contacted patient to confirm 2/4/2020 Dr Olvin huerta at [facility] - patient requested to cancel appointment at this time and stated that she will call back to reschedule her appointment  She was unable to reschedule at the moment due to being at work

## 2020-08-31 ENCOUNTER — OFFICE VISIT (OUTPATIENT)
Dept: URGENT CARE | Facility: CLINIC | Age: 40
End: 2020-08-31
Payer: COMMERCIAL

## 2020-08-31 VITALS
HEART RATE: 72 BPM | TEMPERATURE: 98.1 F | WEIGHT: 267 LBS | OXYGEN SATURATION: 99 % | BODY MASS INDEX: 45.58 KG/M2 | SYSTOLIC BLOOD PRESSURE: 122 MMHG | DIASTOLIC BLOOD PRESSURE: 88 MMHG | HEIGHT: 64 IN | RESPIRATION RATE: 18 BRPM

## 2020-08-31 DIAGNOSIS — H53.8 BLURRY VISION, RIGHT EYE: Primary | ICD-10-CM

## 2020-08-31 PROCEDURE — G0382 LEV 3 HOSP TYPE B ED VISIT: HCPCS

## 2020-08-31 NOTE — PROGRESS NOTES
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 HS  History of Present Illness       Patient with right eye swelling and new onset blurry vision in the past 3 days  She reports no injuries or trauma to the eye  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       Review of Systems   Review of Systems   Constitutional: Negative  HENT: Negative  Eyes: Positive for redness and visual disturbance  Respiratory: Negative  Cardiovascular: Negative  Gastrointestinal: Negative  Genitourinary: Negative  Skin: Negative  Allergic/Immunologic: Negative  Neurological: Negative  Hematological: Negative  Psychiatric/Behavioral: Negative            Current Medications       Current Outpatient Medications:     diazepam (VALIUM) 5 mg tablet, Take 1 tablet (5 mg total) by mouth every 8 (eight) hours as needed for anxiety, Disp: 21 tablet, Rfl: 0    ibuprofen (MOTRIN) 600 mg tablet, Take by mouth every 6 (six) hours as needed for mild pain, Disp: , Rfl:     ketorolac (TORADOL) 10 mg tablet, Take 1 tablet (10 mg total) by mouth every 6 (six) hours as needed for moderate pain, Disp: 20 tablet, Rfl: 0    ondansetron (ZOFRAN) 8 mg tablet, Take 1 tablet (8 mg total) by mouth every 12 (twelve) hours as needed for nausea or vomiting (vertigo), Disp: 30 tablet, Rfl: 0    prochlorperazine (COMPAZINE) 10 mg tablet, Take 1 tablet (10 mg total) by mouth every 6 (six) hours as needed for nausea or vomiting, Disp: 30 tablet, Rfl: 0    SUMAtriptan (IMITREX) 50 mg tablet, Take 1 tab within 15 minutes when headaches starts, with a second dose advised in 2 hours if headache persist, Disp: 9 tablet, Rfl: 3    venlafaxine (EFFEXOR-XR) 37 5 mg 24 hr capsule, Take 1 capsPO at night for 7 days, then increase by 1 caps every week till 4 caps a day, Disp: 120 capsule, Rfl: 2    dicyclomine (BENTYL) 20 mg tablet, Take 1 tablet (20 mg total) by mouth every 6 (six) hours as needed (pain) (Patient not taking: Reported on 10/4/2019), Disp: 10 tablet, Rfl: 0    LORazepam (ATIVAN) 1 mg tablet, Take 1 tab 40 min prior to imaging with a second dose 10 min prior to MRI (Patient not taking: Reported on 10/4/2019), Disp: 2 tablet, Rfl: 0    Current Allergies     Allergies as of 08/31/2020 - Reviewed 08/31/2020   Allergen Reaction Noted    Sulfa antibiotics Shortness Of Breath 02/14/2018    Sulfones [dapsone] Shortness Of Breath 02/14/2018    Sulfonylureas Shortness Of Breath 02/14/2018    Clindamycin Rash 04/24/2013    Penicillins Rash 04/24/2013            The following portions of the patient's history were reviewed and updated as appropriate: allergies, current medications, past family history, past medical history, past social history, past surgical history and problem list      Past Medical History:   Diagnosis Date    Anxiety     Depression     Hyperlipidemia     Insomnia     Low HDL (under 40)     Metabolic syndrome     Morbid obesity ([address] )     Nausea and vomiting     Sore throat     Tonsillitis     UTI (urinary tract infection)     Viral illness        Past Surgical History:   Procedure Laterality Date    LIVER BIOPSY      REDUCTION MAMMAPLASTY  2006       Family History   Problem Relation Age of Onset    No Known Problems Mother     No Known Problems Father          Medications have been verified  Objective   /88   Pulse 72   Temp 98 1 °F (36 7 °C)   Resp 18   Ht 5' 4" (1 626 m)   Wt 121 kg (267 lb)   SpO2 99%   BMI 45 83 kg/m²        Physical Exam     Physical Exam  Vitals signs and nursing note reviewed  Constitutional:       Appearance: She is well-developed  HENT:      Head: Normocephalic  Eyes:      General: Scleral icterus present  Right eye: Discharge present  Extraocular Movements: Extraocular movements intact  Pupils: Pupils are equal, round, and reactive to light  Pulmonary:      Effort: Pulmonary effort is normal    Musculoskeletal: Normal range of motion  Skin:     General: Skin is warm and dry  Neurological:      Mental Status: She is alert and oriented to person, place, and time

## 2021-10-19 ENCOUNTER — TELEPHONE (OUTPATIENT)
Dept: ADMINISTRATIVE | Facility: OTHER | Age: 41
End: 2021-10-19

## 2021-11-02 ENCOUNTER — TELEPHONE (OUTPATIENT)
Dept: FAMILY MEDICINE CLINIC | Facility: CLINIC | Age: 41
End: 2021-11-02

## 2024-11-25 ENCOUNTER — TELEPHONE (OUTPATIENT)
Age: 44
End: 2024-11-25

## 2024-11-25 NOTE — TELEPHONE ENCOUNTER
Patient call about checking for a copy of her vaccine record. I inform patient there on only two in the system. Thank you